# Patient Record
Sex: FEMALE | Race: OTHER | HISPANIC OR LATINO | URBAN - METROPOLITAN AREA
[De-identification: names, ages, dates, MRNs, and addresses within clinical notes are randomized per-mention and may not be internally consistent; named-entity substitution may affect disease eponyms.]

---

## 2017-07-05 ENCOUNTER — INPATIENT (INPATIENT)
Facility: HOSPITAL | Age: 42
LOS: 25 days | Discharge: HOME | End: 2017-07-31
Attending: PSYCHIATRY & NEUROLOGY | Admitting: PSYCHIATRY & NEUROLOGY
Payer: MEDICAID

## 2017-07-05 VITALS
DIASTOLIC BLOOD PRESSURE: 70 MMHG | HEART RATE: 84 BPM | SYSTOLIC BLOOD PRESSURE: 155 MMHG | RESPIRATION RATE: 18 BRPM | OXYGEN SATURATION: 98 % | TEMPERATURE: 98 F

## 2017-07-05 DIAGNOSIS — F33.1 MAJOR DEPRESSIVE DISORDER, RECURRENT, MODERATE: ICD-10-CM

## 2017-07-05 LAB
ALBUMIN SERPL ELPH-MCNC: 4 G/DL — SIGNIFICANT CHANGE UP (ref 3.3–5)
ALP SERPL-CCNC: 47 U/L — SIGNIFICANT CHANGE UP (ref 40–120)
ALT FLD-CCNC: 9 U/L — SIGNIFICANT CHANGE UP (ref 4–33)
APAP SERPL-MCNC: < 15 UG/ML — LOW (ref 15–25)
AST SERPL-CCNC: 14 U/L — SIGNIFICANT CHANGE UP (ref 4–32)
BARBITURATES MEASUREMENT: NEGATIVE — SIGNIFICANT CHANGE UP
BASOPHILS # BLD AUTO: 0.05 K/UL — SIGNIFICANT CHANGE UP (ref 0–0.2)
BASOPHILS NFR BLD AUTO: 0.4 % — SIGNIFICANT CHANGE UP (ref 0–2)
BENZODIAZ SERPL-MCNC: NEGATIVE — SIGNIFICANT CHANGE UP
BILIRUB SERPL-MCNC: 0.2 MG/DL — SIGNIFICANT CHANGE UP (ref 0.2–1.2)
BUN SERPL-MCNC: 6 MG/DL — LOW (ref 7–23)
CALCIUM SERPL-MCNC: 9 MG/DL — SIGNIFICANT CHANGE UP (ref 8.4–10.5)
CHLORIDE SERPL-SCNC: 101 MMOL/L — SIGNIFICANT CHANGE UP (ref 98–107)
CO2 SERPL-SCNC: 27 MMOL/L — SIGNIFICANT CHANGE UP (ref 22–31)
CREAT SERPL-MCNC: 0.69 MG/DL — SIGNIFICANT CHANGE UP (ref 0.5–1.3)
EOSINOPHIL # BLD AUTO: 0.1 K/UL — SIGNIFICANT CHANGE UP (ref 0–0.5)
EOSINOPHIL NFR BLD AUTO: 0.8 % — SIGNIFICANT CHANGE UP (ref 0–6)
ETHANOL BLD-MCNC: < 10 MG/DL — SIGNIFICANT CHANGE UP
GLUCOSE SERPL-MCNC: 90 MG/DL — SIGNIFICANT CHANGE UP (ref 70–99)
HCG SERPL-ACNC: < 5 MIU/ML — SIGNIFICANT CHANGE UP
HCT VFR BLD CALC: 36.9 % — SIGNIFICANT CHANGE UP (ref 34.5–45)
HGB BLD-MCNC: 12.1 G/DL — SIGNIFICANT CHANGE UP (ref 11.5–15.5)
IMM GRANULOCYTES # BLD AUTO: 0.07 # — SIGNIFICANT CHANGE UP
IMM GRANULOCYTES NFR BLD AUTO: 0.5 % — SIGNIFICANT CHANGE UP (ref 0–1.5)
LYMPHOCYTES # BLD AUTO: 1.94 K/UL — SIGNIFICANT CHANGE UP (ref 1–3.3)
LYMPHOCYTES # BLD AUTO: 14.7 % — SIGNIFICANT CHANGE UP (ref 13–44)
MCHC RBC-ENTMCNC: 28.7 PG — SIGNIFICANT CHANGE UP (ref 27–34)
MCHC RBC-ENTMCNC: 32.8 % — SIGNIFICANT CHANGE UP (ref 32–36)
MCV RBC AUTO: 87.4 FL — SIGNIFICANT CHANGE UP (ref 80–100)
MONOCYTES # BLD AUTO: 0.78 K/UL — SIGNIFICANT CHANGE UP (ref 0–0.9)
MONOCYTES NFR BLD AUTO: 5.9 % — SIGNIFICANT CHANGE UP (ref 2–14)
NEUTROPHILS # BLD AUTO: 10.24 K/UL — HIGH (ref 1.8–7.4)
NEUTROPHILS NFR BLD AUTO: 77.7 % — HIGH (ref 43–77)
NRBC # FLD: 0 — SIGNIFICANT CHANGE UP
PLATELET # BLD AUTO: 322 K/UL — SIGNIFICANT CHANGE UP (ref 150–400)
PMV BLD: 10.9 FL — SIGNIFICANT CHANGE UP (ref 7–13)
POTASSIUM SERPL-MCNC: 4 MMOL/L — SIGNIFICANT CHANGE UP (ref 3.5–5.3)
POTASSIUM SERPL-SCNC: 4 MMOL/L — SIGNIFICANT CHANGE UP (ref 3.5–5.3)
PROT SERPL-MCNC: 7.2 G/DL — SIGNIFICANT CHANGE UP (ref 6–8.3)
RBC # BLD: 4.22 M/UL — SIGNIFICANT CHANGE UP (ref 3.8–5.2)
RBC # FLD: 14 % — SIGNIFICANT CHANGE UP (ref 10.3–14.5)
SALICYLATES SERPL-MCNC: < 5 MG/DL — LOW (ref 15–30)
SODIUM SERPL-SCNC: 139 MMOL/L — SIGNIFICANT CHANGE UP (ref 135–145)
TSH SERPL-MCNC: 1.34 UIU/ML — SIGNIFICANT CHANGE UP (ref 0.27–4.2)
WBC # BLD: 13.18 K/UL — HIGH (ref 3.8–10.5)
WBC # FLD AUTO: 13.18 K/UL — HIGH (ref 3.8–10.5)

## 2017-07-05 PROCEDURE — 99285 EMERGENCY DEPT VISIT HI MDM: CPT

## 2017-07-05 RX ORDER — DIPHENHYDRAMINE HCL 50 MG
50 CAPSULE ORAL EVERY 4 HOURS
Qty: 0 | Refills: 0 | Status: DISCONTINUED | OUTPATIENT
Start: 2017-07-06 | End: 2017-07-31

## 2017-07-05 RX ORDER — HALOPERIDOL DECANOATE 100 MG/ML
5 INJECTION INTRAMUSCULAR ONCE
Qty: 0 | Refills: 0 | Status: DISCONTINUED | OUTPATIENT
Start: 2017-07-06 | End: 2017-07-31

## 2017-07-05 RX ORDER — LANOLIN ALCOHOL/MO/W.PET/CERES
3 CREAM (GRAM) TOPICAL ONCE
Qty: 0 | Refills: 0 | Status: DISCONTINUED | OUTPATIENT
Start: 2017-07-06 | End: 2017-07-31

## 2017-07-05 RX ORDER — ACETAMINOPHEN 500 MG
650 TABLET ORAL EVERY 6 HOURS
Qty: 0 | Refills: 0 | Status: DISCONTINUED | OUTPATIENT
Start: 2017-07-06 | End: 2017-07-31

## 2017-07-05 RX ORDER — DIPHENHYDRAMINE HCL 50 MG
50 CAPSULE ORAL ONCE
Qty: 0 | Refills: 0 | Status: DISCONTINUED | OUTPATIENT
Start: 2017-07-06 | End: 2017-07-31

## 2017-07-05 NOTE — ED BEHAVIORAL HEALTH ASSESSMENT NOTE - OTHER
family confused suspected delusions none reported on direct questioning, patient does not seem to be responding to internal stimuli
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
## 2017-07-05 NOTE — ED ADULT NURSE NOTE - NSPATIENTFLAG_GEN_A_ER
Chief Complaint   Patient presents with    Medication Refill     RISPERDAL     Spoke with Angelina Langley (counselor) and informed that Aletha Reynolds DNP approved the 2 week supply of Risperdal.  Left message to both patient's phone that the medication has been filled and sent to [address] [place] [address]
Green (Altered Mental Status/Behavior)
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
## 2017-07-05 NOTE — ED BEHAVIORAL HEALTH ASSESSMENT NOTE - DESCRIPTION
calm, cooperative but vague and illogical, indecisive. Has hard time signing paperwork, taking an unusually long time and a lot of reassurance. Concerned that in the hospital, she will have to be around other people and she might "get worse". none Never was able to keep a job - always late, can't keep hours, also gets paranoid about people looking at her
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
## 2017-07-06 PROCEDURE — 99222 1ST HOSP IP/OBS MODERATE 55: CPT | Mod: GC

## 2017-07-06 RX ORDER — QUETIAPINE FUMARATE 200 MG/1
50 TABLET, FILM COATED ORAL AT BEDTIME
Qty: 0 | Refills: 0 | Status: DISCONTINUED | OUTPATIENT
Start: 2017-07-06 | End: 2017-07-07

## 2017-07-06 RX ORDER — CITALOPRAM 10 MG/1
40 TABLET, FILM COATED ORAL DAILY
Qty: 0 | Refills: 0 | Status: DISCONTINUED | OUTPATIENT
Start: 2017-07-06 | End: 2017-07-07

## 2017-07-06 RX ADMIN — QUETIAPINE FUMARATE 50 MILLIGRAM(S): 200 TABLET, FILM COATED ORAL at 20:34

## 2017-07-07 PROCEDURE — 99232 SBSQ HOSP IP/OBS MODERATE 35: CPT | Mod: GC

## 2017-07-07 PROCEDURE — 70551 MRI BRAIN STEM W/O DYE: CPT | Mod: 26

## 2017-07-07 RX ORDER — QUETIAPINE FUMARATE 200 MG/1
100 TABLET, FILM COATED ORAL AT BEDTIME
Qty: 0 | Refills: 0 | Status: DISCONTINUED | OUTPATIENT
Start: 2017-07-07 | End: 2017-07-09

## 2017-07-07 RX ADMIN — QUETIAPINE FUMARATE 100 MILLIGRAM(S): 200 TABLET, FILM COATED ORAL at 21:43

## 2017-07-08 PROCEDURE — 99232 SBSQ HOSP IP/OBS MODERATE 35: CPT

## 2017-07-08 RX ADMIN — QUETIAPINE FUMARATE 100 MILLIGRAM(S): 200 TABLET, FILM COATED ORAL at 20:49

## 2017-07-09 PROCEDURE — 99232 SBSQ HOSP IP/OBS MODERATE 35: CPT

## 2017-07-09 RX ORDER — QUETIAPINE FUMARATE 200 MG/1
150 TABLET, FILM COATED ORAL AT BEDTIME
Qty: 0 | Refills: 0 | Status: DISCONTINUED | OUTPATIENT
Start: 2017-07-09 | End: 2017-07-11

## 2017-07-09 RX ADMIN — QUETIAPINE FUMARATE 150 MILLIGRAM(S): 200 TABLET, FILM COATED ORAL at 21:20

## 2017-07-10 PROCEDURE — 99232 SBSQ HOSP IP/OBS MODERATE 35: CPT | Mod: GC

## 2017-07-10 RX ADMIN — QUETIAPINE FUMARATE 150 MILLIGRAM(S): 200 TABLET, FILM COATED ORAL at 22:01

## 2017-07-11 PROCEDURE — 99232 SBSQ HOSP IP/OBS MODERATE 35: CPT | Mod: 25,GC

## 2017-07-11 PROCEDURE — 90853 GROUP PSYCHOTHERAPY: CPT

## 2017-07-11 RX ORDER — QUETIAPINE FUMARATE 200 MG/1
200 TABLET, FILM COATED ORAL AT BEDTIME
Qty: 0 | Refills: 0 | Status: DISCONTINUED | OUTPATIENT
Start: 2017-07-11 | End: 2017-07-11

## 2017-07-11 RX ORDER — QUETIAPINE FUMARATE 200 MG/1
250 TABLET, FILM COATED ORAL AT BEDTIME
Qty: 0 | Refills: 0 | Status: DISCONTINUED | OUTPATIENT
Start: 2017-07-11 | End: 2017-07-12

## 2017-07-11 RX ADMIN — QUETIAPINE FUMARATE 250 MILLIGRAM(S): 200 TABLET, FILM COATED ORAL at 22:42

## 2017-07-12 PROCEDURE — 99232 SBSQ HOSP IP/OBS MODERATE 35: CPT | Mod: 25,GC

## 2017-07-12 PROCEDURE — 90853 GROUP PSYCHOTHERAPY: CPT

## 2017-07-12 RX ORDER — QUETIAPINE FUMARATE 200 MG/1
300 TABLET, FILM COATED ORAL AT BEDTIME
Qty: 0 | Refills: 0 | Status: DISCONTINUED | OUTPATIENT
Start: 2017-07-12 | End: 2017-07-13

## 2017-07-12 RX ORDER — QUETIAPINE FUMARATE 200 MG/1
25 TABLET, FILM COATED ORAL DAILY
Qty: 0 | Refills: 0 | Status: DISCONTINUED | OUTPATIENT
Start: 2017-07-12 | End: 2017-07-17

## 2017-07-12 RX ADMIN — QUETIAPINE FUMARATE 300 MILLIGRAM(S): 200 TABLET, FILM COATED ORAL at 21:43

## 2017-07-13 PROCEDURE — 99232 SBSQ HOSP IP/OBS MODERATE 35: CPT | Mod: GC

## 2017-07-13 RX ORDER — QUETIAPINE FUMARATE 200 MG/1
350 TABLET, FILM COATED ORAL AT BEDTIME
Qty: 0 | Refills: 0 | Status: DISCONTINUED | OUTPATIENT
Start: 2017-07-13 | End: 2017-07-14

## 2017-07-13 RX ADMIN — QUETIAPINE FUMARATE 350 MILLIGRAM(S): 200 TABLET, FILM COATED ORAL at 21:58

## 2017-07-13 RX ADMIN — QUETIAPINE FUMARATE 25 MILLIGRAM(S): 200 TABLET, FILM COATED ORAL at 10:13

## 2017-07-14 PROCEDURE — 99232 SBSQ HOSP IP/OBS MODERATE 35: CPT | Mod: GC

## 2017-07-14 RX ORDER — QUETIAPINE FUMARATE 200 MG/1
400 TABLET, FILM COATED ORAL AT BEDTIME
Qty: 0 | Refills: 0 | Status: DISCONTINUED | OUTPATIENT
Start: 2017-07-14 | End: 2017-07-17

## 2017-07-14 RX ADMIN — QUETIAPINE FUMARATE 25 MILLIGRAM(S): 200 TABLET, FILM COATED ORAL at 08:30

## 2017-07-14 RX ADMIN — QUETIAPINE FUMARATE 400 MILLIGRAM(S): 200 TABLET, FILM COATED ORAL at 20:39

## 2017-07-15 RX ADMIN — QUETIAPINE FUMARATE 400 MILLIGRAM(S): 200 TABLET, FILM COATED ORAL at 20:31

## 2017-07-15 RX ADMIN — QUETIAPINE FUMARATE 25 MILLIGRAM(S): 200 TABLET, FILM COATED ORAL at 08:41

## 2017-07-16 RX ADMIN — QUETIAPINE FUMARATE 25 MILLIGRAM(S): 200 TABLET, FILM COATED ORAL at 08:50

## 2017-07-16 RX ADMIN — QUETIAPINE FUMARATE 400 MILLIGRAM(S): 200 TABLET, FILM COATED ORAL at 21:03

## 2017-07-17 PROCEDURE — 99232 SBSQ HOSP IP/OBS MODERATE 35: CPT | Mod: GC

## 2017-07-17 RX ORDER — QUETIAPINE FUMARATE 200 MG/1
400 TABLET, FILM COATED ORAL AT BEDTIME
Qty: 0 | Refills: 0 | Status: DISCONTINUED | OUTPATIENT
Start: 2017-07-17 | End: 2017-07-19

## 2017-07-17 RX ORDER — QUETIAPINE FUMARATE 200 MG/1
75 TABLET, FILM COATED ORAL DAILY
Qty: 0 | Refills: 0 | Status: DISCONTINUED | OUTPATIENT
Start: 2017-07-17 | End: 2017-07-18

## 2017-07-17 RX ADMIN — QUETIAPINE FUMARATE 400 MILLIGRAM(S): 200 TABLET, FILM COATED ORAL at 22:23

## 2017-07-17 RX ADMIN — QUETIAPINE FUMARATE 25 MILLIGRAM(S): 200 TABLET, FILM COATED ORAL at 08:00

## 2017-07-18 PROCEDURE — 99232 SBSQ HOSP IP/OBS MODERATE 35: CPT | Mod: GC

## 2017-07-18 RX ORDER — QUETIAPINE FUMARATE 200 MG/1
125 TABLET, FILM COATED ORAL DAILY
Qty: 0 | Refills: 0 | Status: DISCONTINUED | OUTPATIENT
Start: 2017-07-18 | End: 2017-07-20

## 2017-07-18 RX ADMIN — QUETIAPINE FUMARATE 400 MILLIGRAM(S): 200 TABLET, FILM COATED ORAL at 20:47

## 2017-07-18 RX ADMIN — QUETIAPINE FUMARATE 75 MILLIGRAM(S): 200 TABLET, FILM COATED ORAL at 09:27

## 2017-07-19 PROCEDURE — 99232 SBSQ HOSP IP/OBS MODERATE 35: CPT | Mod: 25,GC

## 2017-07-19 RX ORDER — QUETIAPINE FUMARATE 200 MG/1
450 TABLET, FILM COATED ORAL AT BEDTIME
Qty: 0 | Refills: 0 | Status: DISCONTINUED | OUTPATIENT
Start: 2017-07-19 | End: 2017-07-24

## 2017-07-19 RX ADMIN — QUETIAPINE FUMARATE 125 MILLIGRAM(S): 200 TABLET, FILM COATED ORAL at 09:07

## 2017-07-19 RX ADMIN — QUETIAPINE FUMARATE 450 MILLIGRAM(S): 200 TABLET, FILM COATED ORAL at 20:39

## 2017-07-20 PROCEDURE — 99232 SBSQ HOSP IP/OBS MODERATE 35: CPT | Mod: GC

## 2017-07-20 RX ORDER — QUETIAPINE FUMARATE 200 MG/1
150 TABLET, FILM COATED ORAL DAILY
Qty: 0 | Refills: 0 | Status: DISCONTINUED | OUTPATIENT
Start: 2017-07-21 | End: 2017-07-24

## 2017-07-20 RX ADMIN — QUETIAPINE FUMARATE 450 MILLIGRAM(S): 200 TABLET, FILM COATED ORAL at 21:13

## 2017-07-20 RX ADMIN — QUETIAPINE FUMARATE 125 MILLIGRAM(S): 200 TABLET, FILM COATED ORAL at 09:46

## 2017-07-21 PROCEDURE — 99232 SBSQ HOSP IP/OBS MODERATE 35: CPT | Mod: GC

## 2017-07-21 RX ADMIN — QUETIAPINE FUMARATE 450 MILLIGRAM(S): 200 TABLET, FILM COATED ORAL at 20:58

## 2017-07-21 RX ADMIN — QUETIAPINE FUMARATE 150 MILLIGRAM(S): 200 TABLET, FILM COATED ORAL at 08:46

## 2017-07-22 RX ADMIN — QUETIAPINE FUMARATE 150 MILLIGRAM(S): 200 TABLET, FILM COATED ORAL at 09:52

## 2017-07-22 RX ADMIN — QUETIAPINE FUMARATE 450 MILLIGRAM(S): 200 TABLET, FILM COATED ORAL at 21:17

## 2017-07-23 RX ADMIN — QUETIAPINE FUMARATE 150 MILLIGRAM(S): 200 TABLET, FILM COATED ORAL at 09:31

## 2017-07-23 RX ADMIN — QUETIAPINE FUMARATE 450 MILLIGRAM(S): 200 TABLET, FILM COATED ORAL at 21:58

## 2017-07-24 PROCEDURE — 99232 SBSQ HOSP IP/OBS MODERATE 35: CPT | Mod: GC

## 2017-07-24 RX ORDER — QUETIAPINE FUMARATE 200 MG/1
400 TABLET, FILM COATED ORAL AT BEDTIME
Qty: 0 | Refills: 0 | Status: DISCONTINUED | OUTPATIENT
Start: 2017-07-25 | End: 2017-07-31

## 2017-07-24 RX ORDER — QUETIAPINE FUMARATE 200 MG/1
100 TABLET, FILM COATED ORAL
Qty: 0 | Refills: 0 | Status: DISCONTINUED | OUTPATIENT
Start: 2017-07-25 | End: 2017-07-31

## 2017-07-24 RX ORDER — QUETIAPINE FUMARATE 200 MG/1
450 TABLET, FILM COATED ORAL ONCE
Qty: 0 | Refills: 0 | Status: COMPLETED | OUTPATIENT
Start: 2017-07-24 | End: 2017-07-24

## 2017-07-24 RX ADMIN — QUETIAPINE FUMARATE 450 MILLIGRAM(S): 200 TABLET, FILM COATED ORAL at 22:15

## 2017-07-24 RX ADMIN — QUETIAPINE FUMARATE 150 MILLIGRAM(S): 200 TABLET, FILM COATED ORAL at 08:57

## 2017-07-25 PROCEDURE — 99232 SBSQ HOSP IP/OBS MODERATE 35: CPT | Mod: GC

## 2017-07-25 RX ADMIN — QUETIAPINE FUMARATE 100 MILLIGRAM(S): 200 TABLET, FILM COATED ORAL at 09:18

## 2017-07-25 RX ADMIN — QUETIAPINE FUMARATE 400 MILLIGRAM(S): 200 TABLET, FILM COATED ORAL at 20:23

## 2017-07-25 RX ADMIN — QUETIAPINE FUMARATE 100 MILLIGRAM(S): 200 TABLET, FILM COATED ORAL at 20:23

## 2017-07-26 LAB
ALBUMIN SERPL ELPH-MCNC: 3.8 G/DL — SIGNIFICANT CHANGE UP (ref 3.3–5)
ALP SERPL-CCNC: 47 U/L — SIGNIFICANT CHANGE UP (ref 40–120)
ALT FLD-CCNC: 12 U/L — SIGNIFICANT CHANGE UP (ref 4–33)
AST SERPL-CCNC: 14 U/L — SIGNIFICANT CHANGE UP (ref 4–32)
BILIRUB SERPL-MCNC: 0.4 MG/DL — SIGNIFICANT CHANGE UP (ref 0.2–1.2)
BUN SERPL-MCNC: 7 MG/DL — SIGNIFICANT CHANGE UP (ref 7–23)
CALCIUM SERPL-MCNC: 9 MG/DL — SIGNIFICANT CHANGE UP (ref 8.4–10.5)
CHLORIDE SERPL-SCNC: 102 MMOL/L — SIGNIFICANT CHANGE UP (ref 98–107)
CHOLEST SERPL-MCNC: 201 MG/DL — HIGH (ref 120–199)
CO2 SERPL-SCNC: 29 MMOL/L — SIGNIFICANT CHANGE UP (ref 22–31)
CREAT SERPL-MCNC: 0.7 MG/DL — SIGNIFICANT CHANGE UP (ref 0.5–1.3)
GLUCOSE SERPL-MCNC: 81 MG/DL — SIGNIFICANT CHANGE UP (ref 70–99)
HBA1C BLD-MCNC: 5.2 % — SIGNIFICANT CHANGE UP (ref 4–5.6)
HCT VFR BLD CALC: 37.2 % — SIGNIFICANT CHANGE UP (ref 34.5–45)
HDLC SERPL-MCNC: 45 MG/DL — SIGNIFICANT CHANGE UP (ref 45–65)
HGB BLD-MCNC: 12.4 G/DL — SIGNIFICANT CHANGE UP (ref 11.5–15.5)
LIPID PNL WITH DIRECT LDL SERPL: 150 MG/DL — SIGNIFICANT CHANGE UP
MCHC RBC-ENTMCNC: 29 PG — SIGNIFICANT CHANGE UP (ref 27–34)
MCHC RBC-ENTMCNC: 33.3 % — SIGNIFICANT CHANGE UP (ref 32–36)
MCV RBC AUTO: 87.1 FL — SIGNIFICANT CHANGE UP (ref 80–100)
NRBC # FLD: 0 — SIGNIFICANT CHANGE UP
PLATELET # BLD AUTO: 290 K/UL — SIGNIFICANT CHANGE UP (ref 150–400)
PMV BLD: 10.8 FL — SIGNIFICANT CHANGE UP (ref 7–13)
POTASSIUM SERPL-MCNC: 3.8 MMOL/L — SIGNIFICANT CHANGE UP (ref 3.5–5.3)
POTASSIUM SERPL-SCNC: 3.8 MMOL/L — SIGNIFICANT CHANGE UP (ref 3.5–5.3)
PROT SERPL-MCNC: 7.1 G/DL — SIGNIFICANT CHANGE UP (ref 6–8.3)
RBC # BLD: 4.27 M/UL — SIGNIFICANT CHANGE UP (ref 3.8–5.2)
RBC # FLD: 13.6 % — SIGNIFICANT CHANGE UP (ref 10.3–14.5)
SODIUM SERPL-SCNC: 141 MMOL/L — SIGNIFICANT CHANGE UP (ref 135–145)
TRIGL SERPL-MCNC: 79 MG/DL — SIGNIFICANT CHANGE UP (ref 10–149)
WBC # BLD: 8.09 K/UL — SIGNIFICANT CHANGE UP (ref 3.8–10.5)
WBC # FLD AUTO: 8.09 K/UL — SIGNIFICANT CHANGE UP (ref 3.8–10.5)

## 2017-07-26 PROCEDURE — 99232 SBSQ HOSP IP/OBS MODERATE 35: CPT | Mod: GC

## 2017-07-26 RX ADMIN — QUETIAPINE FUMARATE 400 MILLIGRAM(S): 200 TABLET, FILM COATED ORAL at 21:25

## 2017-07-26 RX ADMIN — QUETIAPINE FUMARATE 100 MILLIGRAM(S): 200 TABLET, FILM COATED ORAL at 08:29

## 2017-07-26 RX ADMIN — QUETIAPINE FUMARATE 100 MILLIGRAM(S): 200 TABLET, FILM COATED ORAL at 21:25

## 2017-07-27 PROCEDURE — 99232 SBSQ HOSP IP/OBS MODERATE 35: CPT | Mod: GC

## 2017-07-27 RX ADMIN — QUETIAPINE FUMARATE 100 MILLIGRAM(S): 200 TABLET, FILM COATED ORAL at 09:05

## 2017-07-27 RX ADMIN — QUETIAPINE FUMARATE 400 MILLIGRAM(S): 200 TABLET, FILM COATED ORAL at 23:11

## 2017-07-27 RX ADMIN — QUETIAPINE FUMARATE 100 MILLIGRAM(S): 200 TABLET, FILM COATED ORAL at 23:11

## 2017-07-28 PROCEDURE — 99232 SBSQ HOSP IP/OBS MODERATE 35: CPT | Mod: GC

## 2017-07-28 RX ORDER — QUETIAPINE FUMARATE 200 MG/1
1 TABLET, FILM COATED ORAL
Qty: 0 | Refills: 0 | COMMUNITY
Start: 2017-07-28

## 2017-07-28 RX ORDER — QUETIAPINE FUMARATE 200 MG/1
1 TABLET, FILM COATED ORAL
Qty: 28 | Refills: 0 | OUTPATIENT
Start: 2017-07-28 | End: 2017-08-11

## 2017-07-28 RX ORDER — QUETIAPINE FUMARATE 200 MG/1
1 TABLET, FILM COATED ORAL
Qty: 14 | Refills: 0 | OUTPATIENT
Start: 2017-07-28 | End: 2017-08-11

## 2017-07-28 RX ADMIN — QUETIAPINE FUMARATE 100 MILLIGRAM(S): 200 TABLET, FILM COATED ORAL at 08:11

## 2017-07-28 RX ADMIN — QUETIAPINE FUMARATE 400 MILLIGRAM(S): 200 TABLET, FILM COATED ORAL at 22:26

## 2017-07-28 RX ADMIN — QUETIAPINE FUMARATE 100 MILLIGRAM(S): 200 TABLET, FILM COATED ORAL at 22:26

## 2017-07-29 RX ADMIN — QUETIAPINE FUMARATE 100 MILLIGRAM(S): 200 TABLET, FILM COATED ORAL at 09:39

## 2017-07-29 RX ADMIN — QUETIAPINE FUMARATE 100 MILLIGRAM(S): 200 TABLET, FILM COATED ORAL at 21:07

## 2017-07-29 RX ADMIN — QUETIAPINE FUMARATE 400 MILLIGRAM(S): 200 TABLET, FILM COATED ORAL at 21:07

## 2017-07-30 RX ADMIN — QUETIAPINE FUMARATE 100 MILLIGRAM(S): 200 TABLET, FILM COATED ORAL at 21:58

## 2017-07-30 RX ADMIN — QUETIAPINE FUMARATE 100 MILLIGRAM(S): 200 TABLET, FILM COATED ORAL at 08:54

## 2017-07-30 RX ADMIN — QUETIAPINE FUMARATE 400 MILLIGRAM(S): 200 TABLET, FILM COATED ORAL at 21:58

## 2017-07-31 VITALS — TEMPERATURE: 98 F | RESPIRATION RATE: 18 BRPM

## 2017-07-31 PROCEDURE — 99239 HOSP IP/OBS DSCHRG MGMT >30: CPT | Mod: GC

## 2017-07-31 RX ORDER — QUETIAPINE FUMARATE 200 MG/1
1 TABLET, FILM COATED ORAL
Qty: 56 | Refills: 0
Start: 2017-07-31 | End: 2017-08-28

## 2017-07-31 RX ORDER — QUETIAPINE FUMARATE 200 MG/1
1 TABLET, FILM COATED ORAL
Qty: 28 | Refills: 0
Start: 2017-07-31 | End: 2017-08-28

## 2017-07-31 RX ADMIN — QUETIAPINE FUMARATE 100 MILLIGRAM(S): 200 TABLET, FILM COATED ORAL at 09:20

## 2021-01-10 ENCOUNTER — INPATIENT (INPATIENT)
Facility: HOSPITAL | Age: 46
LOS: 10 days | Discharge: HOME | End: 2021-01-21
Attending: PSYCHIATRY & NEUROLOGY | Admitting: PSYCHIATRY & NEUROLOGY
Payer: MEDICAID

## 2021-01-10 VITALS
RESPIRATION RATE: 18 BRPM | HEIGHT: 67 IN | DIASTOLIC BLOOD PRESSURE: 99 MMHG | HEART RATE: 100 BPM | OXYGEN SATURATION: 100 % | TEMPERATURE: 99 F | SYSTOLIC BLOOD PRESSURE: 160 MMHG

## 2021-01-10 DIAGNOSIS — F25.0 SCHIZOAFFECTIVE DISORDER, BIPOLAR TYPE: ICD-10-CM

## 2021-01-10 LAB
APPEARANCE UR: CLEAR — SIGNIFICANT CHANGE UP
B PERT DNA SPEC QL NAA+PROBE: SIGNIFICANT CHANGE UP
BASOPHILS # BLD AUTO: 0.05 K/UL — SIGNIFICANT CHANGE UP (ref 0–0.2)
BASOPHILS NFR BLD AUTO: 0.5 % — SIGNIFICANT CHANGE UP (ref 0–2)
BILIRUB UR-MCNC: NEGATIVE — SIGNIFICANT CHANGE UP
C PNEUM DNA SPEC QL NAA+PROBE: SIGNIFICANT CHANGE UP
COLOR SPEC: SIGNIFICANT CHANGE UP
DIFF PNL FLD: NEGATIVE — SIGNIFICANT CHANGE UP
EOSINOPHIL # BLD AUTO: 0.14 K/UL — SIGNIFICANT CHANGE UP (ref 0–0.5)
EOSINOPHIL NFR BLD AUTO: 1.3 % — SIGNIFICANT CHANGE UP (ref 0–6)
FLUAV H1 2009 PAND RNA SPEC QL NAA+PROBE: SIGNIFICANT CHANGE UP
FLUAV H1 RNA SPEC QL NAA+PROBE: SIGNIFICANT CHANGE UP
FLUAV H3 RNA SPEC QL NAA+PROBE: SIGNIFICANT CHANGE UP
FLUAV SUBTYP SPEC NAA+PROBE: SIGNIFICANT CHANGE UP
FLUBV RNA SPEC QL NAA+PROBE: SIGNIFICANT CHANGE UP
GLUCOSE UR QL: NEGATIVE — SIGNIFICANT CHANGE UP
HADV DNA SPEC QL NAA+PROBE: SIGNIFICANT CHANGE UP
HCG SERPL-ACNC: <5 MIU/ML — SIGNIFICANT CHANGE UP
HCOV PNL SPEC NAA+PROBE: SIGNIFICANT CHANGE UP
HCT VFR BLD CALC: 39.3 % — SIGNIFICANT CHANGE UP (ref 34.5–45)
HGB BLD-MCNC: 12.5 G/DL — SIGNIFICANT CHANGE UP (ref 11.5–15.5)
HMPV RNA SPEC QL NAA+PROBE: SIGNIFICANT CHANGE UP
HPIV1 RNA SPEC QL NAA+PROBE: SIGNIFICANT CHANGE UP
HPIV2 RNA SPEC QL NAA+PROBE: SIGNIFICANT CHANGE UP
HPIV3 RNA SPEC QL NAA+PROBE: SIGNIFICANT CHANGE UP
HPIV4 RNA SPEC QL NAA+PROBE: SIGNIFICANT CHANGE UP
IANC: 6.39 K/UL — SIGNIFICANT CHANGE UP (ref 1.5–8.5)
IMM GRANULOCYTES NFR BLD AUTO: 0.4 % — SIGNIFICANT CHANGE UP (ref 0–1.5)
KETONES UR-MCNC: NEGATIVE — SIGNIFICANT CHANGE UP
LEUKOCYTE ESTERASE UR-ACNC: ABNORMAL
LYMPHOCYTES # BLD AUTO: 3.64 K/UL — HIGH (ref 1–3.3)
LYMPHOCYTES # BLD AUTO: 33.2 % — SIGNIFICANT CHANGE UP (ref 13–44)
MCHC RBC-ENTMCNC: 25.6 PG — LOW (ref 27–34)
MCHC RBC-ENTMCNC: 31.8 GM/DL — LOW (ref 32–36)
MCV RBC AUTO: 80.4 FL — SIGNIFICANT CHANGE UP (ref 80–100)
MONOCYTES # BLD AUTO: 0.72 K/UL — SIGNIFICANT CHANGE UP (ref 0–0.9)
MONOCYTES NFR BLD AUTO: 6.6 % — SIGNIFICANT CHANGE UP (ref 2–14)
NEUTROPHILS # BLD AUTO: 6.39 K/UL — SIGNIFICANT CHANGE UP (ref 1.8–7.4)
NEUTROPHILS NFR BLD AUTO: 58 % — SIGNIFICANT CHANGE UP (ref 43–77)
NITRITE UR-MCNC: NEGATIVE — SIGNIFICANT CHANGE UP
NRBC # BLD: 0 /100 WBCS — SIGNIFICANT CHANGE UP
NRBC # FLD: 0 K/UL — SIGNIFICANT CHANGE UP
PCP SPEC-MCNC: SIGNIFICANT CHANGE UP
PH UR: 6.5 — SIGNIFICANT CHANGE UP (ref 5–8)
PLATELET # BLD AUTO: 335 K/UL — SIGNIFICANT CHANGE UP (ref 150–400)
PROT UR-MCNC: ABNORMAL
RAPID RVP RESULT: SIGNIFICANT CHANGE UP
RBC # BLD: 4.89 M/UL — SIGNIFICANT CHANGE UP (ref 3.8–5.2)
RBC # FLD: 16.9 % — HIGH (ref 10.3–14.5)
RSV RNA SPEC QL NAA+PROBE: SIGNIFICANT CHANGE UP
RV+EV RNA SPEC QL NAA+PROBE: SIGNIFICANT CHANGE UP
SARS-COV-2 RNA SPEC QL NAA+PROBE: SIGNIFICANT CHANGE UP
SP GR SPEC: 1.02 — SIGNIFICANT CHANGE UP (ref 1.01–1.02)
TOXICOLOGY SCREEN, DRUGS OF ABUSE, SERUM RESULT: SIGNIFICANT CHANGE UP
TSH SERPL-MCNC: 1.56 UIU/ML — SIGNIFICANT CHANGE UP (ref 0.27–4.2)
UROBILINOGEN FLD QL: SIGNIFICANT CHANGE UP
WBC # BLD: 10.98 K/UL — HIGH (ref 3.8–10.5)
WBC # FLD AUTO: 10.98 K/UL — HIGH (ref 3.8–10.5)

## 2021-01-10 PROCEDURE — 99285 EMERGENCY DEPT VISIT HI MDM: CPT | Mod: GC

## 2021-01-10 RX ORDER — FLUOXETINE HCL 10 MG
20 CAPSULE ORAL DAILY
Refills: 0 | Status: DISCONTINUED | OUTPATIENT
Start: 2021-01-10 | End: 2021-01-11

## 2021-01-10 RX ORDER — ARIPIPRAZOLE 15 MG/1
15 TABLET ORAL AT BEDTIME
Refills: 0 | Status: DISCONTINUED | OUTPATIENT
Start: 2021-01-10 | End: 2021-01-11

## 2021-01-10 RX ORDER — HYDROXYZINE HCL 10 MG
25 TABLET ORAL ONCE
Refills: 0 | Status: COMPLETED | OUTPATIENT
Start: 2021-01-10 | End: 2021-01-10

## 2021-01-10 RX ADMIN — Medication 25 MILLIGRAM(S): at 21:49

## 2021-01-10 RX ADMIN — Medication 20 MILLIGRAM(S): at 21:46

## 2021-01-10 RX ADMIN — ARIPIPRAZOLE 15 MILLIGRAM(S): 15 TABLET ORAL at 21:46

## 2021-01-10 RX ADMIN — Medication 1 MILLIGRAM(S): at 17:33
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
## 2021-01-10 NOTE — ED BEHAVIORAL HEALTH ASSESSMENT NOTE - OTHER
none reported on direct questioning, patient does not seem to be responding to internal stimuli domiciled on and off sister intense suspected delusions somewhat guarded patient boarding overnight - pending covid results and no [facility] beds currently available

## 2021-01-10 NOTE — ED BEHAVIORAL HEALTH ASSESSMENT NOTE - CASE SUMMARY
45 year-old with acute decompensation in the context of chronic psychosis, unable to care for herself, requires inpatient admission for safety and stabilization

## 2021-01-10 NOTE — ED BEHAVIORAL HEALTH ASSESSMENT NOTE - RISK ASSESSMENT
Patient's risk of harm to self and others is elevated by an underlying condition, SAD, with recent aggression and suicidal statements towards family. Protective factors: patient denies SI/HI, denies overt psychotic symptoms, denies depressed mood. However, risk factors include: patient is guarded/evasive, appears paranoid, and per collateral from sister patient has had worsening bizarre behavior and aggression towards family in the past week. Also with recent med change by outpatient provider (addition of Risperdal). For these reasons, patient's risk of harm is elevated to a degree which necessitates inpatient psych hospitalization for safety and stabilization. High Acute Suicide Risk
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
## 2021-01-10 NOTE — ED BEHAVIORAL HEALTH ASSESSMENT NOTE - PSYCHIATRIC ISSUES AND PLAN (INCLUDE STANDING AND PRN MEDICATION)
SAD - start Abilify 15mg daily and Prozac 20mg daily. PRNs for agitation/severe agitation: Haldol 5mg, Ativan 2mg po/IM q6H

## 2021-01-10 NOTE — ED BEHAVIORAL HEALTH ASSESSMENT NOTE - DESCRIPTION
arthritis Patient was calm and cooperative while in the ED, although somewhat bizarre and appearing paranoid/evasive at times. No PRNs needed.    Vital Signs Last 24 Hrs  T(C): 37 (10 Olivier 2021 16:15), Max: 37 (10 Olivier 2021 16:15)  T(F): 98.6 (10 Olivier 2021 16:15), Max: 98.6 (10 Olivier 2021 16:15)  HR: 100 (10 Olivier 2021 16:15) (100 - 100)  BP: 160/99 (10 Olivier 2021 16:15) (160/99 - 160/99)  BP(mean): --  RR: 18 (10 Olivier 2021 16:15) (18 - 18)  SpO2: 100% (10 Olivier 2021 16:15) (100% - 100%) per chart review: Never was able to keep a job - always late, can't keep hours, also gets paranoid about people looking at her

## 2021-01-10 NOTE — ED PROVIDER NOTE - PROGRESS NOTE DETAILS
SAUNDRA: I was signed out this pt pending Admission to [facility] for HI and erratic behaviour. I noticed CMP was not returned from lab. per lab, computer issue. Paper lab results sent up and were reviewed and are WNL. Will medically clear to be admitted to [facility] when space/bed available.
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
## 2021-01-10 NOTE — ED ADULT NURSE NOTE - OBJECTIVE STATEMENT
Pt received to  area complaining of depression for the past month. Pt states she wants to speak with someone, she has not been able to get an appt with her psychiatrist. Pt has a hx of depression and anxiety. Pt states she has been complaint with her Abilify and prozac. Pt denies si/hi. Pt calm and cooperative.
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
## 2021-01-10 NOTE — ED ADULT TRIAGE NOTE - CHIEF COMPLAINT QUOTE
Pt c/o increased depression X1 month, hx of anxiety & depression. Reports not being able to get an appointment with her psychiatrist. Currently taking Abilify & Prozac as prescribed. Denies ETOH use, drug use, SI/HI, hallucinations.    Stephany Muniz, sister: [phone]

## 2021-01-10 NOTE — ED BEHAVIORAL HEALTH ASSESSMENT NOTE - NS ED BHA PLAN ADMIT TO PSYCHIATRY BH CONTACTED FT
Spoke with Delia Grant NP after finding her phone # in NPI profile. Will email her details of this ED visit. She provided email address
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
## 2021-01-10 NOTE — ED PROVIDER NOTE - CLINICAL SUMMARY MEDICAL DECISION MAKING FREE TEXT BOX
46 y/o F   Labs, Urine Tox/UA, EKG  Medical evaluation performed. There is no clinical evidence of intoxication or any acute medical problem requiring immediate intervention. Patient is awaiting psychiatric consultation. Final disposition will be determined by psychiatrist. 46 y/o F   Labs, Urine Tox/UA, EKG  Medical evaluation performed. There is no clinical evidence of intoxication or any acute medical problem requiring immediate intervention. Patient is awaiting psychiatric consultation. Final disposition will be determined by psychiatrist. Awaiting inpatient psychiatric bed.

## 2021-01-10 NOTE — ED ADULT NURSE NOTE - CHIEF COMPLAINT QUOTE
Pt c/o increased depression X1 month, hx of anxiety & depression. Reports not being able to get an appointment with her psychiatrist. Currently taking Abilify & Prozac as prescribed. Denies ETOH use, drug use, SI/HI, hallucinations.    Stephany Muniz, sister: [phone]

## 2021-01-11 DIAGNOSIS — F25.9 SCHIZOAFFECTIVE DISORDER, UNSPECIFIED: ICD-10-CM

## 2021-01-11 LAB
SARS-COV-2 IGG SERPL QL IA: NEGATIVE — SIGNIFICANT CHANGE UP
SARS-COV-2 IGM SERPL IA-ACNC: 0.12 INDEX — SIGNIFICANT CHANGE UP

## 2021-01-11 PROCEDURE — 99213 OFFICE O/P EST LOW 20 MIN: CPT

## 2021-01-11 PROCEDURE — 99222 1ST HOSP IP/OBS MODERATE 55: CPT

## 2021-01-11 PROCEDURE — 93010 ELECTROCARDIOGRAM REPORT: CPT

## 2021-01-11 PROCEDURE — 99285 EMERGENCY DEPT VISIT HI MDM: CPT | Mod: 25

## 2021-01-11 RX ORDER — DIPHENHYDRAMINE HCL 50 MG
50 CAPSULE ORAL EVERY 6 HOURS
Refills: 0 | Status: DISCONTINUED | OUTPATIENT
Start: 2021-01-11 | End: 2021-01-21

## 2021-01-11 RX ORDER — DIPHENHYDRAMINE HCL 50 MG
50 CAPSULE ORAL ONCE
Refills: 0 | Status: DISCONTINUED | OUTPATIENT
Start: 2021-01-11 | End: 2021-01-21

## 2021-01-11 RX ORDER — ARIPIPRAZOLE 15 MG/1
15 TABLET ORAL AT BEDTIME
Refills: 0 | Status: DISCONTINUED | OUTPATIENT
Start: 2021-01-11 | End: 2021-01-14

## 2021-01-11 RX ORDER — FLUOXETINE HCL 10 MG
20 CAPSULE ORAL DAILY
Refills: 0 | Status: DISCONTINUED | OUTPATIENT
Start: 2021-01-11 | End: 2021-01-11

## 2021-01-11 RX ORDER — HALOPERIDOL DECANOATE 100 MG/ML
5 INJECTION INTRAMUSCULAR EVERY 6 HOURS
Refills: 0 | Status: DISCONTINUED | OUTPATIENT
Start: 2021-01-11 | End: 2021-01-21

## 2021-01-11 RX ORDER — LANOLIN ALCOHOL/MO/W.PET/CERES
5 CREAM (GRAM) TOPICAL AT BEDTIME
Refills: 0 | Status: DISCONTINUED | OUTPATIENT
Start: 2021-01-11 | End: 2021-01-21

## 2021-01-11 RX ADMIN — Medication 5 MILLIGRAM(S): at 20:23

## 2021-01-11 RX ADMIN — ARIPIPRAZOLE 15 MILLIGRAM(S): 15 TABLET ORAL at 20:23

## 2021-01-11 NOTE — ED ADULT NURSE REASSESSMENT NOTE - NS ED NURSE REASSESS COMMENT FT1
Pt sleeping; respirations even and non labored. Pt awaiting psychiatric inpatient admission bed when available.
Pt sleeping in  room 3.  Respirations even and unlabored.  Will continue to monitor.
Pt sleeping in  room 3.  Respirations even and unlabored.  Will continue to monitor.

## 2021-01-11 NOTE — BH CONSULTATION LIAISON PROGRESS NOTE - CASE SUMMARY
44F with schizoaffective disorder presents for bizarre behavior. Patient guarded, vague, per family is not taking meds, aggressive and talking about suicide. She requires admisison to stabilize. Admit on 939. I agree with note/plan.

## 2021-01-11 NOTE — BH INPATIENT PSYCHIATRY ASSESSMENT NOTE - RISK ASSESSMENT
Patient's risk of harm to self and others is elevated by an underlying condition, SAD, with recent aggression and suicidal statements towards family. Protective factors: patient denies SI/HI, denies overt psychotic symptoms, denies depressed mood. However, risk factors include: patient is guarded/evasive, appears paranoid, and per collateral from sister patient has had worsening bizarre behavior and aggression towards family in the past week. Also with recent med change by outpatient provider (addition of Risperdal). For these reasons, patient's risk of harm is elevated to a degree which necessitates inpatient psych hospitalization for safety and stabilization.

## 2021-01-11 NOTE — BH INPATIENT PSYCHIATRY ASSESSMENT NOTE - CURRENT MEDICATION
MEDICATIONS  (STANDING):    MEDICATIONS  (PRN):  diphenhydrAMINE 50 milliGRAM(s) Oral every 6 hours PRN agitation  diphenhydrAMINE   Injectable 50 milliGRAM(s) IntraMuscular once PRN agitation  haloperidol     Tablet 5 milliGRAM(s) Oral every 6 hours PRN agitaion  haloperidol    Injectable 5 milliGRAM(s) IntraMuscular every 6 hours PRN severe agitaion  LORazepam     Tablet 2 milliGRAM(s) Oral every 6 hours PRN agitation  LORazepam   Injectable 2 milliGRAM(s) IntraMuscular every 6 hours PRN severe agitaion  melatonin. 5 milliGRAM(s) Oral at bedtime PRN Insomnia

## 2021-01-11 NOTE — BH CONSULTATION LIAISON PROGRESS NOTE - NSBHCHARTREVIEWVS_PSY_A_CORE FT
Vital Signs Last 24 Hrs  T(C): 36.7 (11 Jan 2021 07:53), Max: 37.1 (10 Olivier 2021 20:06)  T(F): 98 (11 Jan 2021 07:53), Max: 98.7 (10 Olivier 2021 20:06)  HR: 116 (11 Jan 2021 07:53) (100 - 116)  BP: 131/90 (11 Jan 2021 07:53) (125/84 - 160/99)  BP(mean): --  RR: 16 (11 Jan 2021 07:53) (16 - 18)  SpO2: 100% (11 Jan 2021 07:53) (100% - 100%)

## 2021-01-11 NOTE — BH PATIENT PROFILE - STATED REASON FOR ADMISSION
Pt stated "I've been feeling anxious".  Pt guarded and withholding information, answering "no" to most questions.  Pt admitted for bizarre behavior and aggression at home, making suicidal statements and took 6 Tylenol tabs in front of her mother

## 2021-01-11 NOTE — BH CONSULTATION LIAISON PROGRESS NOTE - NSBHFUPINTERVALHXFT_PSY_A_CORE
Receive pt resting, calm, cooperative, thought process seem more organized, does not appear internally preoccupied, paranoid or delusional at present.  Pt reports of little sleep last night, did not want to elaborate on reason for being in the ED, reports her overall mood as "good", denies SI/HI, denies AVH.  Has been in good behavioral control in the ED, no prn's administered overnight.  Reinforced that she is admitted and waiting for bed placement.  States "ok" about being admitted.

## 2021-01-11 NOTE — BH INPATIENT PSYCHIATRY ASSESSMENT NOTE - NSBHASSESSSUMMFT_PSY_ALL_CORE
On the unit: The patient was visible on the unit, guarded, superficially engaged in the interview. The patient has poor insight into her illness and stated she does not understand why she is in the hospital. The patient denies events leading up to her admission into the hospital and stated "I do not understand why I am in here involuntary and not voluntary", "they should have given me a choice to be voluntary". The patient minimalizes her symptoms and stated "I just have a little bit of anxiety". She denies perceptual AH, VH, TH. Paranoia elicited. She denies SI, HI, intent and plan. The patient stated her mood is "okay".    >Obs: Routine, no current SI. no need for CO, patient not expected to pose risk to self or others in controlled inpatient setting.  >Psychiatric Meds: Abilify 15mg po daily.  >Medical:  No acute concerns  >Social: milieu therapy  >Treatment Interventions: Groups and Individual Therapy  >Dispo: Collateral and dispo planning pending further symptom and medication optimization.
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
## 2021-01-11 NOTE — BH INPATIENT PSYCHIATRY ASSESSMENT NOTE - OTHER
none reported on direct questioning, patient does not seem to be responding to internal stimuli suspected delusions and paranoia. at present appears linear, however presented with impaired reasoning
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
## 2021-01-11 NOTE — BH PATIENT PROFILE - HOME MEDICATIONS
QUEtiapine 400 mg oral tablet , 1 tab(s) orally once a day (at bedtime)  QUEtiapine 100 mg oral tablet , 1 tab(s) orally 2 times a day

## 2021-01-11 NOTE — BH INPATIENT PSYCHIATRY ASSESSMENT NOTE - NSBHCHARTREVIEWVS_PSY_A_CORE FT
Vital Signs Last 24 Hrs  T(C): 36.5 (11 Jan 2021 14:24), Max: 37.1 (10 Olivier 2021 20:06)  T(F): 97.7 (11 Jan 2021 14:24), Max: 98.7 (10 Olivier 2021 20:06)  HR: 102 (11 Jan 2021 11:17) (100 - 116)  BP: 128/81 (11 Jan 2021 11:17) (125/84 - 160/99)  BP(mean): --  RR: 16 (11 Jan 2021 11:17) (16 - 18)  SpO2: 100% (11 Jan 2021 11:17) (100% - 100%)

## 2021-01-11 NOTE — BH INPATIENT PSYCHIATRY ASSESSMENT NOTE - NSSUICPROTFACT_PSY_ALL_CORE
Responsibility to children, family, or others/Identifies reasons for living/Positive therapeutic relationships

## 2021-01-11 NOTE — BH CONSULTATION LIAISON PROGRESS NOTE - NSBHASSESSMENTFT_PSY_ALL_CORE
Pt is a 45 y/o  female with PPH of schizoaffective d/o with multiple inpatient hospitalizations, compliance with meds, recently started on Risperdal, presents with 1 week of worsening bizarre behavior, aggression towards family members, and suicidal statements. At present pt is calm, cooperative, does not appear psychotic, manic at present, and has been in good behavioral control in the ED. She has been some what evasive, guarded, did not want to elaborate on reasons for being in the ED. Collateral information provided, reports that pt has decompensated, suicidal, not doing well. Pt will be admitted on a 9.39 basis and will be transferred when a bed becomes available.

## 2021-01-11 NOTE — BH CONSULTATION LIAISON PROGRESS NOTE - NSBHCONSULTIPREASON_PSY_A_CORE
danger to others; mental illness expected to respond to inpatient care danger to self; mental illness expected to respond to inpatient care

## 2021-01-11 NOTE — BH CONSULTATION LIAISON PROGRESS NOTE - OTHER
"good" none reported on direct questioning, patient does not seem to be responding to internal stimuli at present appears linear, however presented with impaired reasoning  unable to elicit

## 2021-01-11 NOTE — BH INPATIENT PSYCHIATRY ASSESSMENT NOTE - DESCRIPTION
single, has 2 teenage sons (per sister, not in her custody) unemployed, domiciled intermittently with mom vs. homeless shelter. per chart review: Never was able to keep a job - always late, can't keep hours, also gets paranoid about people looking at her.

## 2021-01-11 NOTE — BH INPATIENT PSYCHIATRY ASSESSMENT NOTE - NSBHMETABOLIC_PSY_ALL_CORE_FT
BMI: BMI (kg/m2): 37.6 (01-11-21 @ 12:50)  HbA1c:   Glucose:   BP: 128/81 (01-11-21 @ 11:17) (125/84 - 160/99)  Lipid Panel:

## 2021-01-11 NOTE — BH PATIENT PROFILE - NSBHHOMTHTS_PSY_A_CORE
Pt denies current H/I/I/P and denies any past thoughts to hurt others, however per [facility] collateral report pt has been aggressive and threatening at home/absent
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
## 2021-01-11 NOTE — BH PATIENT PROFILE - LIVES WITH
As per [facility] report collateral, pt living between shelter, mother's house and boyfriend's house/alone/domestic partner/parent(s)

## 2021-01-11 NOTE — BH CONSULTATION LIAISON PROGRESS NOTE - CURRENT MEDICATION
MEDICATIONS  (STANDING):  ARIPiprazole 15 milliGRAM(s) Oral at bedtime  FLUoxetine 20 milliGRAM(s) Oral daily    MEDICATIONS  (PRN):

## 2021-01-12 LAB
A1C WITH ESTIMATED AVERAGE GLUCOSE RESULT: 5.5 % — SIGNIFICANT CHANGE UP (ref 4–5.6)
CHOLEST SERPL-MCNC: 185 MG/DL — SIGNIFICANT CHANGE UP
ESTIMATED AVERAGE GLUCOSE: 111 MG/DL — SIGNIFICANT CHANGE UP (ref 68–114)
HDLC SERPL-MCNC: 34 MG/DL — LOW
LIPID PNL WITH DIRECT LDL SERPL: 121 MG/DL — HIGH
NON HDL CHOLESTEROL: 151 MG/DL — HIGH
TRIGL SERPL-MCNC: 148 MG/DL — SIGNIFICANT CHANGE UP

## 2021-01-12 PROCEDURE — 99232 SBSQ HOSP IP/OBS MODERATE 35: CPT

## 2021-01-12 RX ORDER — LITHIUM CARBONATE 300 MG/1
300 TABLET, EXTENDED RELEASE ORAL
Refills: 0 | Status: DISCONTINUED | OUTPATIENT
Start: 2021-01-12 | End: 2021-01-15

## 2021-01-12 RX ORDER — BENZTROPINE MESYLATE 1 MG
0.5 TABLET ORAL DAILY
Refills: 0 | Status: DISCONTINUED | OUTPATIENT
Start: 2021-01-12 | End: 2021-01-21

## 2021-01-12 RX ADMIN — Medication 5 MILLIGRAM(S): at 20:05

## 2021-01-12 RX ADMIN — ARIPIPRAZOLE 15 MILLIGRAM(S): 15 TABLET ORAL at 20:05

## 2021-01-12 RX ADMIN — LITHIUM CARBONATE 300 MILLIGRAM(S): 300 TABLET, EXTENDED RELEASE ORAL at 20:06

## 2021-01-12 NOTE — PSYCHIATRIC REHAB INITIAL EVALUATION - NSBHPRRECOMMEND_PSY_ALL_CORE
Writer met with patient in order to orient patient to unit, provide patient with a schedule, and introduce patient to psychiatric staff and department functions. Patient was verbal, polite, and cooperative with personal information. Writer collaborated with patient to select an appropriate psychiatric rehabilitation goal. Psychiatric rehabilitation staff will continue to engage patient daily in order to develop therapeutic rapport. In response to COVID19, unit programming will be re-evaluated on a consistent basis in effort to maintain safety guidelines.

## 2021-01-12 NOTE — BH INPATIENT PSYCHIATRY PROGRESS NOTE - NSBHFUPINTERVALHXFT_PSY_A_CORE
Patient seen for follow up for schizoaffective disorder, aggression towards family, chart reviewed, and case discussed with treatment team.  No events reported overnight and no PRN medication for agitation. The patient was visible on the unit, guarded, superficially engaged in the interview. The patient has poor insight into her illness but is accepting treatment. She continues to deny events leading up to her admission into the hospital and stated "I just have anxiety, none of that is true". The patient is possible minimalizing her symptoms to expedite discharge. She is discharge focused and constantly asking to leave. She denies perceptual AH, VH, TH. Internally preoccupied in the interview. Paranoia elicited. She denies SI, HI, intent and plan. Noted patient is attending and participating in group therapy.  Patient seen for follow up for schizoaffective disorder, aggression towards family, chart reviewed, and case discussed with treatment team.  No events reported overnight and no PRN medication for agitation. The patient was visible on the unit, guarded, superficially engaged in the interview. The patient has poor insight into her illness but is accepting treatment, "can you increase my medication?". She continues to deny events leading up to her admission into the hospital and stated "I just have anxiety, none of that is true". The patient is possible minimalizing her symptoms to expedite discharge. She is discharge focused and constantly asking to leave. She denies perceptual AH, VH, TH. Internally preoccupied in the interview. Paranoia elicited. She denies SI, HI, intent and plan. Noted patient is attending and participating in group therapy.

## 2021-01-13 PROCEDURE — 99232 SBSQ HOSP IP/OBS MODERATE 35: CPT

## 2021-01-13 RX ADMIN — ARIPIPRAZOLE 15 MILLIGRAM(S): 15 TABLET ORAL at 20:10

## 2021-01-13 RX ADMIN — Medication 5 MILLIGRAM(S): at 20:10

## 2021-01-13 RX ADMIN — LITHIUM CARBONATE 300 MILLIGRAM(S): 300 TABLET, EXTENDED RELEASE ORAL at 20:11

## 2021-01-13 RX ADMIN — LITHIUM CARBONATE 300 MILLIGRAM(S): 300 TABLET, EXTENDED RELEASE ORAL at 08:55
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
## 2021-01-13 NOTE — BH INPATIENT PSYCHIATRY PROGRESS NOTE - OTHER
at present appears linear, however presented with impaired reasoning  Flat. suspected delusions and paranoia. "good"

## 2021-01-14 PROCEDURE — 99232 SBSQ HOSP IP/OBS MODERATE 35: CPT

## 2021-01-14 RX ORDER — ARIPIPRAZOLE 15 MG/1
20 TABLET ORAL DAILY
Refills: 0 | Status: DISCONTINUED | OUTPATIENT
Start: 2021-01-15 | End: 2021-01-21

## 2021-01-14 RX ADMIN — LITHIUM CARBONATE 300 MILLIGRAM(S): 300 TABLET, EXTENDED RELEASE ORAL at 20:18

## 2021-01-14 RX ADMIN — LITHIUM CARBONATE 300 MILLIGRAM(S): 300 TABLET, EXTENDED RELEASE ORAL at 08:55

## 2021-01-14 RX ADMIN — Medication 5 MILLIGRAM(S): at 20:18

## 2021-01-14 NOTE — BH INPATIENT PSYCHIATRY PROGRESS NOTE - OTHER
Flat. "good" suspected delusions and paranoia. at present appears linear, however presented with impaired reasoning  Delayed response.

## 2021-01-14 NOTE — BH INPATIENT PSYCHIATRY PROGRESS NOTE - NSBHFUPINTERVALHXFT_PSY_A_CORE
Patient seen for follow up for schizoaffective disorder, aggression towards family, chart reviewed, and case discussed with treatment team.  No events reported overnight and no PRN medication required for agitation. The patient continues to be visible on the unit and in good behavorial control. Her mood is "I'm good". Her sleep and appetite are "good". She is cooperative with treatment and is agreeing to continue outpatient services. She denies perceptual AH, VH, TH. Internally preoccupied during the interview. Paranoia elicited. She denies SI, HI, intent and plan. Noted patient is attending and participating in group therapy. She is compliant with her medication regimen and denies side effects.

## 2021-01-15 LAB — LITHIUM SERPL-MCNC: 0.3 MMOL/L — LOW (ref 0.6–1.2)

## 2021-01-15 PROCEDURE — 99232 SBSQ HOSP IP/OBS MODERATE 35: CPT

## 2021-01-15 RX ORDER — LITHIUM CARBONATE 300 MG/1
300 TABLET, EXTENDED RELEASE ORAL DAILY
Refills: 0 | Status: DISCONTINUED | OUTPATIENT
Start: 2021-01-16 | End: 2021-01-20

## 2021-01-15 RX ORDER — LITHIUM CARBONATE 300 MG/1
600 TABLET, EXTENDED RELEASE ORAL AT BEDTIME
Refills: 0 | Status: DISCONTINUED | OUTPATIENT
Start: 2021-01-15 | End: 2021-01-19

## 2021-01-15 RX ADMIN — ARIPIPRAZOLE 20 MILLIGRAM(S): 15 TABLET ORAL at 09:07

## 2021-01-15 RX ADMIN — LITHIUM CARBONATE 300 MILLIGRAM(S): 300 TABLET, EXTENDED RELEASE ORAL at 09:07

## 2021-01-15 RX ADMIN — Medication 5 MILLIGRAM(S): at 20:37

## 2021-01-15 RX ADMIN — LITHIUM CARBONATE 600 MILLIGRAM(S): 300 TABLET, EXTENDED RELEASE ORAL at 20:36

## 2021-01-15 NOTE — BH PSYCHOLOGY - GROUP THERAPY NOTE - NSBHPSYCHOLRESPCOMMENT_PSY_A_CORE FT
Pt appeared adequately groomed and casually dressed. Pt appeared relatively engaged in the group discussion as evidenced by her willingness to engage in the group topic with prompting. During check-in, Pt shared that she enjoys walking on the beach, adding that she is able to listen to the water and walk on the sand. Pt was able to identify that “drinking more water” is a way that she can better take care of herself on the unit. Speech was WNL. PT was oriented X3. PT was appropriate with others.

## 2021-01-15 NOTE — BH INPATIENT PSYCHIATRY PROGRESS NOTE - OTHER
"I'm okay" suspected delusions and paranoia. Flat. Delayed response.  at present appears linear, however presented with impaired reasoning

## 2021-01-15 NOTE — BH PSYCHOLOGY - GROUP THERAPY NOTE - NSPSYCHOLGRPCOGPT_PSY_A_CORE FT
Patient attended Cognitive Behavioral Therapy Group. The group started with a brief check-in during which Pts recalled their favorite places to visit and why. The group then focused on the topic of mindfulness and acceptance and ways in which they are able to engage in coping skills on the unit.  explained concepts, reinforced participation, and engaged patients in the discussion.

## 2021-01-15 NOTE — BH PSYCHOLOGY - GROUP THERAPY NOTE - NSPSYCHOLGRPCOGINT_PSY_A_CORE
group members provided support/group members suggested positive behaviors/mindfulness skills taught/relaxation skills practiced/other..

## 2021-01-15 NOTE — BH SOCIAL WORK INITIAL PSYCHOSOCIAL EVALUATION - OTHER PAST PSYCHIATRIC HISTORY (INCLUDE DETAILS REGARDING ONSET, COURSE OF ILLNESS, INPATIENT/OUTPATIENT TREATMENT)
Patient is a 44yo female, single, has 2 teenage sons (per sister, not in her custody) unemployed, domiciled intermittently with mom vs. homeless shelter, PPH of SAD and 5 past inpatient psych hospitalizations, most recently at [facility] in 2017, no past SAs, who presents to [facility] ED BIB sister for 1 week of worsening bizarre behavior, aggression towards family members, and suicidal statements.

## 2021-01-15 NOTE — BH SOCIAL WORK INITIAL PSYCHOSOCIAL EVALUATION - NSBHCHILDAGEFT_PSY_ALL_CORE
Patient has 2 teenage sons (per sister, not in her custody). 17 year old son lives with pt and mother, 12 year old son lives with the father.

## 2021-01-15 NOTE — BH INPATIENT PSYCHIATRY PROGRESS NOTE - NSBHFUPINTERVALHXFT_PSY_A_CORE
Patient seen for follow up for schizoaffective disorder, aggression towards family, chart reviewed, and case discussed with treatment team.  No events reported overnight and no PRN medication required for agitation. The patient reported her mood as "I'm okay". The patient continues to be visible on the unit and in good behavorial control. Her sleep and appetite are "good". Today, the patient is discharge focused. She denies perceptual AH, VH, TH. Internally preoccupied during the interview. Paranoia elicited. She denies SI, HI, intent and plan. She is compliant with her medication regimen and denies side effects.

## 2021-01-16 RX ADMIN — Medication 5 MILLIGRAM(S): at 20:12

## 2021-01-16 RX ADMIN — LITHIUM CARBONATE 300 MILLIGRAM(S): 300 TABLET, EXTENDED RELEASE ORAL at 08:53

## 2021-01-16 RX ADMIN — ARIPIPRAZOLE 20 MILLIGRAM(S): 15 TABLET ORAL at 08:53

## 2021-01-16 RX ADMIN — LITHIUM CARBONATE 600 MILLIGRAM(S): 300 TABLET, EXTENDED RELEASE ORAL at 20:12

## 2021-01-17 RX ADMIN — LITHIUM CARBONATE 300 MILLIGRAM(S): 300 TABLET, EXTENDED RELEASE ORAL at 09:00

## 2021-01-17 RX ADMIN — LITHIUM CARBONATE 600 MILLIGRAM(S): 300 TABLET, EXTENDED RELEASE ORAL at 20:17

## 2021-01-17 RX ADMIN — ARIPIPRAZOLE 20 MILLIGRAM(S): 15 TABLET ORAL at 09:00

## 2021-01-17 RX ADMIN — Medication 5 MILLIGRAM(S): at 20:16

## 2021-01-18 RX ADMIN — Medication 5 MILLIGRAM(S): at 20:01

## 2021-01-18 RX ADMIN — ARIPIPRAZOLE 20 MILLIGRAM(S): 15 TABLET ORAL at 08:56

## 2021-01-18 RX ADMIN — LITHIUM CARBONATE 600 MILLIGRAM(S): 300 TABLET, EXTENDED RELEASE ORAL at 20:01

## 2021-01-18 RX ADMIN — LITHIUM CARBONATE 300 MILLIGRAM(S): 300 TABLET, EXTENDED RELEASE ORAL at 08:56

## 2021-01-19 LAB
ALBUMIN SERPL ELPH-MCNC: 3.8 G/DL — SIGNIFICANT CHANGE UP (ref 3.3–5)
ALP SERPL-CCNC: 55 U/L — SIGNIFICANT CHANGE UP (ref 40–120)
ALT FLD-CCNC: 14 U/L — SIGNIFICANT CHANGE UP (ref 4–33)
ANION GAP SERPL CALC-SCNC: 10 MMOL/L — SIGNIFICANT CHANGE UP (ref 7–14)
AST SERPL-CCNC: 14 U/L — SIGNIFICANT CHANGE UP (ref 4–32)
BASOPHILS # BLD AUTO: 0.05 K/UL — SIGNIFICANT CHANGE UP (ref 0–0.2)
BASOPHILS NFR BLD AUTO: 0.5 % — SIGNIFICANT CHANGE UP (ref 0–2)
BILIRUB SERPL-MCNC: 0.5 MG/DL — SIGNIFICANT CHANGE UP (ref 0.2–1.2)
BUN SERPL-MCNC: 9 MG/DL — SIGNIFICANT CHANGE UP (ref 7–23)
CALCIUM SERPL-MCNC: 9.4 MG/DL — SIGNIFICANT CHANGE UP (ref 8.4–10.5)
CHLORIDE SERPL-SCNC: 104 MMOL/L — SIGNIFICANT CHANGE UP (ref 98–107)
CO2 SERPL-SCNC: 25 MMOL/L — SIGNIFICANT CHANGE UP (ref 22–31)
CREAT SERPL-MCNC: 0.71 MG/DL — SIGNIFICANT CHANGE UP (ref 0.5–1.3)
EOSINOPHIL # BLD AUTO: 0.24 K/UL — SIGNIFICANT CHANGE UP (ref 0–0.5)
EOSINOPHIL NFR BLD AUTO: 2.2 % — SIGNIFICANT CHANGE UP (ref 0–6)
GLUCOSE SERPL-MCNC: 149 MG/DL — HIGH (ref 70–99)
HCT VFR BLD CALC: 39 % — SIGNIFICANT CHANGE UP (ref 34.5–45)
HGB BLD-MCNC: 12.3 G/DL — SIGNIFICANT CHANGE UP (ref 11.5–15.5)
IANC: 7.45 K/UL — SIGNIFICANT CHANGE UP (ref 1.5–8.5)
IMM GRANULOCYTES NFR BLD AUTO: 0.5 % — SIGNIFICANT CHANGE UP (ref 0–1.5)
LITHIUM SERPL-MCNC: 0.4 MMOL/L — LOW (ref 0.6–1.2)
LYMPHOCYTES # BLD AUTO: 2.6 K/UL — SIGNIFICANT CHANGE UP (ref 1–3.3)
LYMPHOCYTES # BLD AUTO: 24.2 % — SIGNIFICANT CHANGE UP (ref 13–44)
MCHC RBC-ENTMCNC: 25.7 PG — LOW (ref 27–34)
MCHC RBC-ENTMCNC: 31.5 GM/DL — LOW (ref 32–36)
MCV RBC AUTO: 81.4 FL — SIGNIFICANT CHANGE UP (ref 80–100)
MONOCYTES # BLD AUTO: 0.35 K/UL — SIGNIFICANT CHANGE UP (ref 0–0.9)
MONOCYTES NFR BLD AUTO: 3.3 % — SIGNIFICANT CHANGE UP (ref 2–14)
NEUTROPHILS # BLD AUTO: 7.45 K/UL — HIGH (ref 1.8–7.4)
NEUTROPHILS NFR BLD AUTO: 69.3 % — SIGNIFICANT CHANGE UP (ref 43–77)
NRBC # BLD: 0 /100 WBCS — SIGNIFICANT CHANGE UP
NRBC # FLD: 0 K/UL — SIGNIFICANT CHANGE UP
PLATELET # BLD AUTO: 348 K/UL — SIGNIFICANT CHANGE UP (ref 150–400)
POTASSIUM SERPL-MCNC: 4 MMOL/L — SIGNIFICANT CHANGE UP (ref 3.5–5.3)
POTASSIUM SERPL-SCNC: 4 MMOL/L — SIGNIFICANT CHANGE UP (ref 3.5–5.3)
PROT SERPL-MCNC: 7.4 G/DL — SIGNIFICANT CHANGE UP (ref 6–8.3)
RBC # BLD: 4.79 M/UL — SIGNIFICANT CHANGE UP (ref 3.8–5.2)
RBC # FLD: 16.1 % — HIGH (ref 10.3–14.5)
SODIUM SERPL-SCNC: 139 MMOL/L — SIGNIFICANT CHANGE UP (ref 135–145)
WBC # BLD: 10.74 K/UL — HIGH (ref 3.8–10.5)
WBC # FLD AUTO: 10.74 K/UL — HIGH (ref 3.8–10.5)

## 2021-01-19 PROCEDURE — 99232 SBSQ HOSP IP/OBS MODERATE 35: CPT | Mod: 25

## 2021-01-19 PROCEDURE — 90853 GROUP PSYCHOTHERAPY: CPT

## 2021-01-19 RX ORDER — LITHIUM CARBONATE 300 MG/1
900 TABLET, EXTENDED RELEASE ORAL AT BEDTIME
Refills: 0 | Status: DISCONTINUED | OUTPATIENT
Start: 2021-01-19 | End: 2021-01-20

## 2021-01-19 RX ADMIN — LITHIUM CARBONATE 300 MILLIGRAM(S): 300 TABLET, EXTENDED RELEASE ORAL at 09:49

## 2021-01-19 RX ADMIN — LITHIUM CARBONATE 900 MILLIGRAM(S): 300 TABLET, EXTENDED RELEASE ORAL at 20:24

## 2021-01-19 RX ADMIN — ARIPIPRAZOLE 20 MILLIGRAM(S): 15 TABLET ORAL at 09:48

## 2021-01-19 RX ADMIN — Medication 5 MILLIGRAM(S): at 20:23

## 2021-01-19 NOTE — BH INPATIENT PSYCHIATRY PROGRESS NOTE - OTHER
at present appears linear, however presented with impaired reasoning  Delayed response.  suspected delusions and paranoia. Flat. "Good"

## 2021-01-19 NOTE — BH INPATIENT PSYCHIATRY PROGRESS NOTE - NSBHFUPINTERVALHXFT_PSY_A_CORE
Patient seen for follow up for schizoaffective disorder, aggression towards family, chart reviewed, and case discussed with treatment team.  No events reported overnight/ over the weekend and no PRN medication required for agitation. The patient reported her mood as "good". She is visible on the unit and in good behavorial control. Her sleep and appetite are "good". She denies perceptual AH, VH, TH. Suspected internal stimuli and delayed response. Paranoia elicited. She denies SI, HI, intent and plan. She is compliant with her medication regimen and denies side effects. She remains discharge focused "When will I leave". The writer discussed discharge plans and treatment plans with the patient.

## 2021-01-20 PROCEDURE — 99232 SBSQ HOSP IP/OBS MODERATE 35: CPT

## 2021-01-20 RX ORDER — LITHIUM CARBONATE 300 MG/1
2 TABLET, EXTENDED RELEASE ORAL
Qty: 28 | Refills: 0
Start: 2021-01-20 | End: 2021-02-02

## 2021-01-20 RX ORDER — LITHIUM CARBONATE 300 MG/1
1200 TABLET, EXTENDED RELEASE ORAL AT BEDTIME
Refills: 0 | Status: DISCONTINUED | OUTPATIENT
Start: 2021-01-21 | End: 2021-01-21

## 2021-01-20 RX ORDER — ARIPIPRAZOLE 15 MG/1
1 TABLET ORAL
Qty: 0 | Refills: 0 | DISCHARGE
Start: 2021-01-20

## 2021-01-20 RX ORDER — ARIPIPRAZOLE 15 MG/1
1 TABLET ORAL
Qty: 14 | Refills: 0
Start: 2021-01-20 | End: 2021-02-02

## 2021-01-20 RX ORDER — BENZTROPINE MESYLATE 1 MG
1 TABLET ORAL
Qty: 0 | Refills: 0 | DISCHARGE
Start: 2021-01-20

## 2021-01-20 RX ORDER — BENZTROPINE MESYLATE 1 MG
1 TABLET ORAL
Qty: 14 | Refills: 0
Start: 2021-01-20 | End: 2021-02-02

## 2021-01-20 RX ORDER — LITHIUM CARBONATE 300 MG/1
2 TABLET, EXTENDED RELEASE ORAL
Qty: 0 | Refills: 0 | DISCHARGE
Start: 2021-01-20

## 2021-01-20 RX ORDER — LITHIUM CARBONATE 300 MG/1
900 TABLET, EXTENDED RELEASE ORAL ONCE
Refills: 0 | Status: COMPLETED | OUTPATIENT
Start: 2021-01-20 | End: 2021-01-20

## 2021-01-20 RX ADMIN — LITHIUM CARBONATE 300 MILLIGRAM(S): 300 TABLET, EXTENDED RELEASE ORAL at 08:22

## 2021-01-20 RX ADMIN — Medication 5 MILLIGRAM(S): at 20:13

## 2021-01-20 RX ADMIN — LITHIUM CARBONATE 900 MILLIGRAM(S): 300 TABLET, EXTENDED RELEASE ORAL at 20:13

## 2021-01-20 RX ADMIN — ARIPIPRAZOLE 20 MILLIGRAM(S): 15 TABLET ORAL at 08:22

## 2021-01-20 NOTE — BH TREATMENT PLAN - NSTXDCOTHRINTERSW_PSY_ALL_CORE
SW will provide support, insight, discharge planning, psycho-education, and maintain contact with identified supports

## 2021-01-20 NOTE — BH DISCHARGE NOTE NURSING/SOCIAL WORK/PSYCH REHAB - NSCDUDCCRISIS_PSY_A_CORE
[facility] Well  1 [phone]) [facility]-WELL ([phone])  Text "WELL" to [phone]  Website: www.[employer]/.[employer] [facility] 1 (903) 251-SMSX (0103) Website: www.safehorizon.org/.National Suicide Prevention Lifeline 2 (616) 354-8385/.  [employer]  1 (734) LIF[employer] (094-6482)/.  [facility]  [address] [place] [phone]   Hours:  Monday through Friday from 9 AM to 3 PM/.  [employer] - [facility] Crisis Line  6 [phone], Option 1

## 2021-01-20 NOTE — BH INPATIENT PSYCHIATRY PROGRESS NOTE - NSBHFUPINTERVALHXFT_PSY_A_CORE
Patient seen for follow up for schizoaffective disorder, aggression towards family, chart reviewed, and case discussed with treatment team.  No events reported overnight no PRN medication required for agitation. The patient is visible on the unit and attends group therapy regularly. The patient reported her mood as "good". Her sleep and appetite are "good". She denies perceptual AH, VH, TH. Suspected internal stimuli and delayed response. Paranoia elicited. She denies SI, HI, intent and plan. She is compliant with her medication regimen and denies side effects. Safety plans and discharge plans discussed with the patient's sister today as per social work.

## 2021-01-20 NOTE — BH TREATMENT PLAN - NSTXPATIENTPARTICIPATE_PSY_ALL_CORE
Patient participated in identification of needs/problems/goals for treatment
Patient participated in identification of needs/problems/goals for treatment

## 2021-01-20 NOTE — BH DISCHARGE NOTE NURSING/SOCIAL WORK/PSYCH REHAB - PATIENT PORTAL LINK FT
You can access the FollowMyHealth Patient Portal offered by [facility] by registering at the following website: [email or web address]. By joining [employer]s FollowMyHealth portal, you will also be able to view your health information using other applications (apps) compatible with our system.

## 2021-01-20 NOTE — BH TREATMENT PLAN - NSTXCAREGIVERPARTICIPATE_PSY_P_CORE
Family/Caregiver participated in identification of needs/problems/goals for treatment/Family/Caregiver participated in defining interventions
Family refusing for the patient to return home and prefers a shelter./Family/Caregiver participated in defining interventions

## 2021-01-20 NOTE — BH TREATMENT PLAN - NSTXPLANTHERAPYSESSIONSFT_PSY_ALL_CORE
01-19-21  Type of therapy: Peer advocate,Relapse prevention  Type of session: Individual  Level of patient participation: Engaged  Duration of participation: 15 minutes  Therapy conducted by: Psych rehab  Therapy Summary: During session, patient stated that her mood as improved and she no longer endorses high anxiety. Writer provided support. Patient stated she is currently discharge oriented and is hoping to leave soon. Writer provided support and encouraged patient to continue engaging in coping skills on unit.     Patient has attended approximately 80% of psych rehab groups during the last seven days.

## 2021-01-20 NOTE — BH INPATIENT PSYCHIATRY PROGRESS NOTE - OTHER
Flat. "Good" Delayed response.  suspected delusions and paranoia. at present appears linear, however presented with impaired reasoning

## 2021-01-20 NOTE — BH TREATMENT PLAN - NSDCCRITERIA_PSY_ALL_CORE
Sx reduction, medication management, safety planning, milieu therapy, outpatient psychiatric care.
Sx reduction, medication management, safety planning, milieu therapy, outpatient psychiatric care.

## 2021-01-20 NOTE — BH DISCHARGE NOTE NURSING/SOCIAL WORK/PSYCH REHAB - NSDCPRGOAL_PSY_ALL_CORE
Patient has made fair progress towards goal during current hospitalization. Patient identified going for walks, completing word search puzzles, and reading as positive coping skills. Writer provided support and encouraged patient to continue utilizing skills post discharge. Patient was receptive.     On the unit, patient was engaged in milieu. Patient stated that she is hopeful in regards to discharge and follow up treatment. Patient and writer completed p[atient safety planning form during session. Patient denies SI/HI    Patient attended approximately 85% of psych rehab groups during current hospitalization. Patient was verbally engaged and able to tolerate session structure.     Patient was provided with press ganey survey.

## 2021-01-20 NOTE — BH TREATMENT PLAN - NSTXDCHOUSINTERMD_PSY_ALL_CORE
The patient will be offered shelter accommodations since her family reports aggression towards family. 
The patient will be offered shelter accommodations since her family reports aggression towards family.

## 2021-01-20 NOTE — BH TREATMENT PLAN - NSTXDCOTHRGOAL_PSY_ALL_CORE
Pt will show a reduction of disorganized behaviors and engage meaningfully with writer to identify a safe discharge plan.

## 2021-01-20 NOTE — BH TREATMENT PLAN - NSTXANXINTERPR_PSY_ALL_CORE
Pt has made fair progress, as patient has identified reading, oging fo rwalks, and doing word search puzzles as positive coping skills. Writer provided support and encouragement and encouraged patient to utilize positive coping skills post discharge. Patient was receptive.
Patient’s psychiatric rehabilitation goal is to meet with staff individually and attend psychiatric rehabilitation groups, in order for patient to identify 3 healthy coping skills to manage anxiety and sustained recovery within 7 days.

## 2021-01-20 NOTE — BH TREATMENT PLAN - ANXIETY/PANIC/FEAR NURSING INTERVENTIONS/RECOMMENDATIONS
Patient is encouraged to continue treatment compliance
Patient is encouraged to continue treatment compliance

## 2021-01-20 NOTE — BH TREATMENT PLAN - NSTXANXGOAL_PSY_ALL_CORE
Identify and practice 3 coping skills to manage anxiety
Identify and practice 3 coping skills to manage anxiety

## 2021-01-21 VITALS — TEMPERATURE: 99 F | DIASTOLIC BLOOD PRESSURE: 96 MMHG | HEART RATE: 100 BPM | SYSTOLIC BLOOD PRESSURE: 126 MMHG

## 2021-01-21 PROCEDURE — 99238 HOSP IP/OBS DSCHRG MGMT 30/<: CPT

## 2021-01-21 RX ADMIN — ARIPIPRAZOLE 20 MILLIGRAM(S): 15 TABLET ORAL at 08:15

## 2021-01-21 NOTE — BH INPATIENT PSYCHIATRY PROGRESS NOTE - NSTXANXGOAL_PSY_ALL_CORE
Identify and practice 3 coping skills to manage anxiety

## 2021-01-21 NOTE — BH INPATIENT PSYCHIATRY DISCHARGE NOTE - HOSPITAL COURSE
*** Hospitalization dates 1/11/2021- 1/21/2021   ED note: “Patient is a 44yo female, single, has 2 teenage sons (per sister, not in her custody) unemployed, domiciled intermittently with mom vs. homeless shelter, PPH of SAD and 5 past inpatient psych hospitalizations, most recently at [facility] in 2017, no past SAs, who presents to [facility] ED BIB sister for 1 week of worsening bizarre behavior, aggression towards family members, and suicidal statements.   Patient was seen and assessed in  ED today, upon interview, she appears somewhat guarded, looking around the room suspiciously, somewhat evasive and defensive at times. Intense eye contact. However, she is cooperative with interview and in good behavioral control. Patient says that her sister brought her to the ED because "she wants me out of the house" and is unable to elaborate. 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 Per patient, she has been living with her mom and her 18yo son in mom's house. Patient also has a 13yo son who is domiciled with his father. Patient claims that she has joint custody of 13yo son. When asked about what school her 18yo attends, patient says "I'd rather not answer that" and appears suspicious/guarded.  Patient denies depressed mood and denies SI/HI. No access to guns. She reports good sleep lately (sleeping more than usual) with somewhat low energy during the day. Fair appetite. She denies AVH/delusions. She denies having access to guns. Patient also denies alcohol and/or illicit drug use. Denies tobacco use. Reports some anxiety about not having a job but no panic attacks. Patient says she is under the outpatient psychiatric care of Delia Grant at [facility] - she is prescribed Abilify 15mg daily, Prozac 40mg daily, and was also recently started on Risperdal by Delia Grant. Patient unaware of Risperdal dose (she first says she has been taking 10mg Risperdal but then becomes unsure). She also reports seeing a therapist Dr. Mccracken every other week”.  Immediate risk was minimized by inpatient admission to a safe environment with appropriate supervision. The patient's sister Stephany ([phone]) reported that patient implied she would burn the house down - patient denies this. Patient also denies any desire to harm self or others. She says she wants to go home because "I have things I need to do." Per her, patient has had approx 1 week of worsening bizarre behavior, coming in and out of mom's house, being aggressive with family. She recently threw the TV remote at sister. The patient made statements "I want to kill myself" and then took 6 tabs of tylenol in front of her mom. In addition, she made menacing gestures at mom. The patient has also made vague statements such as "wait and see what happens to her - she's not gonna have a house" when speaking about her other sister. She has been repeating "why did I ever have kids" and "I want to kill myself." Patient's sister Stephany believes she has been living between her mom's house, a shelter, and possibly various boyfriends' houses. Patient's family including her 2 sons are reportedly afraid of what she may do and do not feel safe being around her. She also recently left the oven and stove on, set off the smoke alarm in the house, and did not have an explanation for why this happened. No suicide attempts in the past, but patient has a history of bizarre behavior with family and aggressive/menacing behavior.  Behavior: While inpatient, the patient was visible on the unit, guarded, superficially engaged in the interview. The patient had poor insight into her illness but was accepting treatment. She continued to deny events leading up to her admission into the hospital and stated "I just have anxiety, none of that is true". The patient was possibly minimalizing her symptoms to expedite discharge. She was discharge focused and constantly asking to leave. She denied perceptual AH, VH, TH. Internally preoccupied in the interview. Paranoia elicited. She denied SI, HI, intent and plan. She did not require PRN medication, seclusion room or restraints for agitation. The patient was compliant with her medication regimen and attended group therapy. The patient noted she gained insight from the group therapy sessions on how to maintain her behavior when discharge.   Treatment: The patient was continued with home medication Aripiprazole 15mg po daily and it was increased to 20mg. Lithium was added at 300mg po twice a day and it was titrated to 300mg po daily and 900mg po at bedtime. Lithium level on 1/19 was 0.4. Lithium was then titrated to 1200mg at bedtime. The patient tolerated this medication regimen well and denied side effects. She noted symptom improvement with medication. The patient was discharged with medication regimen as follows: Aripiprazole 20mg po daily, Benztropine 0.5mg po daily as needed and Lithium 1200mg po at bedtime. The patient will follow-up with her outpatient at the [facility] and [facility] at [facility].  On discharge: The patient seen for follow up for schizoaffective disorder, aggression towards family, chart reviewed, and case discussed with treatment team.  No events reported overnight and no PRN medication required for agitation. The patient was visible on the unit with minimal social contact. She attended group therapy regularly and verbalized learning coping skills. Her mood was "good", "I am ready to go home". Her sleep and appetite were "good". She denied perceptual AH, VH, TH. Suspected internal stimuli and delayed response. Paranoia elicited. She denied SI, HI, intent and plan. She was compliant with her medication regimen and denied side effects. Safety plans and discharge plans discussed. The patient was accompanied home by her sister.  MSE: Level of Consciousness-Alert, General Appearance-Well developed, Body Habitus-Well nourished, Hygiene-Fair, Grooming-Fair, Behavior-Cooperative, Eye Contact- Fair, Relatedness-Poor, Impulse Control-Normal, Muscle Tone/Strength-Normal muscle tone/strength, Abnormal Movements-No abnormal movements, Gait/Station-Normal gait / station, Speech Abnormality-Delayed response, Mood- "Good", Affect Quality-Flat, Affect Range- Blunted, Affect Congruence- Congruent, Thought Process- appears linear, however presented with impaired reasoning, Thought Associations-Normal, Thought Content-suspected delusions and paranoia, Perceptions-No abnormalities, Orientation-Oriented to time, place, person, situation, Attention/Concentration- Impaired, Estimated Intelligence-Average, Recent Memory-Normal, Remote Memory- Impaired, Language-No abnormalities noted, Judgment-Fair, Insight-Poor.  Patient was provided with extensive psychoeducation on treatment options and motivational counseling targeting healthy lifestyle. Patient was instructed on actions for crisis situations, understood and agreed to follow instructions for handling crisis, including coming to ER or calling 911 should the patient or their family feel that they are in danger of hurting self or others. Patient also was given Suicide Prevention Lifeline number 1-838.433.9334 and provided with instructions on its use.   Plan:   -Aripiprazole 20mg po daily.  -Benztropine 0.5mg po daily as needed.   -Lithium 1200mg po at bedtime.  -Outpatient follow-up and [facility].   For further collateral information, please contact POLLY Beckett at [phone] or [phone], email: [email or web address].

## 2021-01-21 NOTE — BH INPATIENT PSYCHIATRY PROGRESS NOTE - NSCGIIMPROVESX_PSY_ALL_CORE
3 = Minimally improved - slightly better with little or no clinically meaningful reduction of symptoms.  Represents very little change in basic clinical status, level of care, or functional capacity.

## 2021-01-21 NOTE — BH INPATIENT PSYCHIATRY PROGRESS NOTE - NSDCCRITERIA_PSY_ALL_CORE
Sx reduction, medication management, safety planning, milieu therapy, outpatient psychiatric care.

## 2021-01-21 NOTE — BH INPATIENT PSYCHIATRY PROGRESS NOTE - NSICDXBHTERTIARYDX_PSY_ALL_CORE
Aggression   R46.97  
Aggression   R46.84  
Aggression   R46.17  
Aggression   R46.22  
Aggression   R46.90  
Aggression   R46.88  
Aggression   R46.83

## 2021-01-21 NOTE — BH INPATIENT PSYCHIATRY PROGRESS NOTE - NSBHPSYCHOLCOGORIENT_PSY_A_CORE
Oriented to time, place, person, situation

## 2021-01-21 NOTE — BH INPATIENT PSYCHIATRY PROGRESS NOTE - NSTXPROBDCHOUS_PSY_ALL_CORE
DISCHARGE ISSUE - LACK OF APPROPRIATE HOUSING

## 2021-01-21 NOTE — BH INPATIENT PSYCHIATRY DISCHARGE NOTE - HPI (INCLUDE ILLNESS QUALITY, SEVERITY, DURATION, TIMING, CONTEXT, MODIFYING FACTORS, ASSOCIATED SIGNS AND SYMPTOMS)
Patient is a 46yo female, single, has 2 teenage sons (per sister, not in her custody) unemployed, domiciled intermittently with mom vs. homeless shelter, PPH of SAD and 5 past inpatient psych hospitalizations, most recently at [facility] in 2017, no past SAs, who presents to [facility] ED BIB sister for 1 week of worsening bizarre behavior, aggression towards family members, and suicidal statements.     Patient was seen and assessed in  ED today, upon interview, she appears somewhat guarded, looking around the room suspiciously, somewhat evasive and defensive at times. Intense eye contact. However, she is cooperative with interview and in good behavioral control. Patient says that her sister brought her to the ED because "she wants me out of the house" and is unable to elaborate. 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 Per patient, she has been living with her mom and her 16yo son in mom's house. Patient also has a 13yo son who is domiciled with his father. Patient claims that she has joint custody of 13yo son. When asked about what school her 16yo attends, patient says "I'd rather not answer that" and appears suspicious/guarded.     Patient denies depressed mood and denies SI/HI. No access to guns. She reports good sleep lately (sleeping more than usual) with somewhat low energy during the day. Fair appetite. She denies AVH/delusions. She denies having access to guns. Patient also denies alcohol and/or illicit drug use. Denies tobacco use. Reports some anxiety about not having a job but no panic attacks. Patient says she is under the outpatient psychiatric care of Delia Grant at [facility] - she is prescribed Abilify 15mg daily, Prozac 40mg daily, and was also recently started on Risperdal by Delia Grant. Patient unaware of Risperdal dose (she first says she has been taking 10mg Risperdal but then becomes unsure). She also reports seeing a therapist Dr. Mccracken every other week.     Of note, patient's sister Stephany reported that patient implied she would burn the house down - patient denies this. Patient also denies any desire to harm self or others. She says she wants to go home because "I have things I need to do."     Collateral was obtained from patient's sister Stephany ([phone]). Per her, patient has had approx 1 week of worsening bizarre behavior, coming in and out of mom's house, being aggressive with family. She recently threw the TV remote at sister. This AM, patient made statement "I want to kill myself" and then took 6 tabs of tylenol in front of her mom. In addition, she made menacing gestures at mom. This week, patient has also made vague statements such as "wait and see what happens to her - she's not gonna have a house" when speaking about her other sister. She has been repeating "why did I ever have kids" and "I want to kill myself." Patient's sister Stephany believes she has been living between her mom's house, a shelter, and possibly various boyfriends' houses. Patient's family including her 2 sons are reportedly afraid of what she may do and do not feel safe being around her. She also recently left the oven and stove on, set off the smoke alarm in the house, and did not have an explanation for why this happened. No suicide attempts in the past, but patient has a history of bizarre behavior with family and aggressive/menacing behavior.

## 2021-01-21 NOTE — BH INPATIENT PSYCHIATRY PROGRESS NOTE - NSTXDCHOUSINTERMD_PSY_ALL_CORE
The patient will be offered shelter accommodations since her family reports aggression towards family. 
none
The patient will be offered shelter accommodations since her family reports aggression towards family. 

## 2021-01-21 NOTE — BH INPATIENT PSYCHIATRY DISCHARGE NOTE - OTHER PAST PSYCHIATRIC HISTORY (INCLUDE DETAILS REGARDING ONSET, COURSE OF ILLNESS, INPATIENT/OUTPATIENT TREATMENT)
Patient is a 46yo female, single, has 2 teenage sons (per sister, not in her custody) unemployed, domiciled intermittently with mom vs. homeless shelter, PPH of SAD and 5 past inpatient psych hospitalizations, most recently at [facility] in 2017, no past SAs, who presents to [facility] ED BIB sister for 1 week of worsening bizarre behavior, aggression towards family members, and suicidal statements.

## 2021-01-21 NOTE — BH INPATIENT PSYCHIATRY PROGRESS NOTE - NSBHMSEREMMEM_PSY_A_CORE
Impaired
Unable to assess

## 2021-01-21 NOTE — BH INPATIENT PSYCHIATRY PROGRESS NOTE - NSBHCHARTREVIEWVS_PSY_A_CORE FT
Vital Signs Last 24 Hrs  T(C): 36.6 (14 Jan 2021 06:37), Max: 36.8 (13 Jan 2021 14:26)  T(F): 97.8 (14 Jan 2021 06:37), Max: 98.2 (13 Jan 2021 14:26)  HR: --93  BP: --147/83  BP(mean): --  RR: 18 (13 Jan 2021 21:38) (18 - 18)  SpO2: 100% (13 Jan 2021 21:38) (100% - 100%)
Vital Signs Last 24 Hrs  T(C): 36.1 (19 Jan 2021 06:48), Max: 36.6 (18 Jan 2021 19:28)  T(F): 97 (19 Jan 2021 06:48), Max: 97.8 (18 Jan 2021 19:28)  HR: --94  BP: --131/71  BP(mean): --  RR: 18 (18 Jan 2021 21:06) (18 - 18)  SpO2: 100% (18 Jan 2021 21:06) (100% - 100%)
Vital Signs Last 24 Hrs  T(C): 36.2 (20 Jan 2021 14:19), Max: 37 (19 Jan 2021 19:49)  T(F): 97.2 (20 Jan 2021 14:19), Max: 98.6 (19 Jan 2021 19:49)  HR: 90 (20 Jan 2021 08:18) (90 - 90)  BP: 137/89 (20 Jan 2021 08:18) (137/89 - 137/89)  BP(mean): --  RR: 16 (19 Jan 2021 22:55) (16 - 16)  SpO2: 98% (19 Jan 2021 22:55) (98% - 98%)
Vital Signs Last 24 Hrs  T(C): 36.1 (13 Jan 2021 07:49), Max: 36.7 (12 Jan 2021 19:40)  T(F): 96.9 (13 Jan 2021 07:49), Max: 98 (12 Jan 2021 19:40)  HR: --101  BP: --126/80  BP(mean): --  RR: 18 (12 Jan 2021 19:40) (18 - 18)  SpO2: 99% (12 Jan 2021 19:40) (99% - 99%)
Vital Signs Last 24 Hrs  T(C): 36.3 (12 Jan 2021 14:06), Max: 36.6 (11 Jan 2021 19:45)  T(F): 97.4 (12 Jan 2021 14:06), Max: 97.8 (11 Jan 2021 19:45)  HR: --100  BP: --114/73  BP(mean): --  RR: 18 (11 Jan 2021 19:45) (18 - 18)  SpO2: 99% (11 Jan 2021 19:45) (99% - 99%)
Vital Signs Last 24 Hrs  T(C): 37.1 (21 Jan 2021 08:15), Max: 37.1 (21 Jan 2021 08:15)  T(F): 98.7 (21 Jan 2021 08:15), Max: 98.7 (21 Jan 2021 08:15)  HR: 100 (21 Jan 2021 08:15) (100 - 100)  BP: 126/96 (21 Jan 2021 08:15) (126/96 - 126/96)  BP(mean): --  RR: --  SpO2: --
Vital Signs Last 24 Hrs  T(C): 36.5 (15 Olivier 2021 08:20), Max: 36.6 (14 Jan 2021 14:35)  T(F): 97.7 (15 Olivier 2021 08:20), Max: 97.8 (14 Jan 2021 14:35)  HR: --105  BP: --135/69  BP(mean): --  RR: 18 (14 Jan 2021 22:38) (18 - 18)  SpO2: 100% (14 Jan 2021 22:38) (100% - 100%)

## 2021-01-21 NOTE — BH INPATIENT PSYCHIATRY PROGRESS NOTE - NSBHMETABOLIC_PSY_ALL_CORE_FT
BMI: BMI (kg/m2): 37.6 (01-11-21 @ 12:50)  HbA1c: A1C with Estimated Average Glucose Result: 5.5 % (01-12-21 @ 10:00)    Glucose:   BP: --  Lipid Panel: Date/Time: 01-12-21 @ 10:00  Cholesterol, Serum: 185  Direct LDL: --  HDL Cholesterol, Serum: 34  Total Cholesterol/HDL Ration Measurement: --  Triglycerides, Serum: 148  
BMI: BMI (kg/m2): 37.6 (01-11-21 @ 12:50)  HbA1c: A1C with Estimated Average Glucose Result: 5.5 % (01-12-21 @ 10:00)    Glucose:   BP: 128/81 (01-11-21 @ 11:17) (125/84 - 160/99)  Lipid Panel: Date/Time: 01-12-21 @ 10:00  Cholesterol, Serum: 185  Direct LDL: --  HDL Cholesterol, Serum: 34  Total Cholesterol/HDL Ration Measurement: --  Triglycerides, Serum: 148  
BMI: BMI (kg/m2): 37.6 (01-11-21 @ 12:50)  HbA1c: A1C with Estimated Average Glucose Result: 5.5 % (01-12-21 @ 10:00)    Glucose:   BP: 137/89 (01-20-21 @ 08:18) (131/82 - 137/89)  Lipid Panel: Date/Time: 01-12-21 @ 10:00  Cholesterol, Serum: 185  Direct LDL: --  HDL Cholesterol, Serum: 34  Total Cholesterol/HDL Ration Measurement: --  Triglycerides, Serum: 148  
BMI: BMI (kg/m2): 37.6 (01-11-21 @ 12:50)  HbA1c: A1C with Estimated Average Glucose Result: 5.5 % (01-12-21 @ 10:00)    Glucose:   BP: 126/96 (01-21-21 @ 08:15) (126/96 - 137/89)  Lipid Panel: Date/Time: 01-12-21 @ 10:00  Cholesterol, Serum: 185  Direct LDL: --  HDL Cholesterol, Serum: 34  Total Cholesterol/HDL Ration Measurement: --  Triglycerides, Serum: 148  
BMI: BMI (kg/m2): 37.6 (01-11-21 @ 12:50)  HbA1c: A1C with Estimated Average Glucose Result: 5.5 % (01-12-21 @ 10:00)    Glucose:   BP: --  Lipid Panel: Date/Time: 01-12-21 @ 10:00  Cholesterol, Serum: 185  Direct LDL: --  HDL Cholesterol, Serum: 34  Total Cholesterol/HDL Ration Measurement: --  Triglycerides, Serum: 148  
BMI: BMI (kg/m2): 37.6 (01-11-21 @ 12:50)  HbA1c: A1C with Estimated Average Glucose Result: 5.5 % (01-12-21 @ 10:00)    Glucose:   BP: 128/81 (01-11-21 @ 11:17) (125/84 - 160/99)  Lipid Panel: Date/Time: 01-12-21 @ 10:00  Cholesterol, Serum: 185  Direct LDL: --  HDL Cholesterol, Serum: 34  Total Cholesterol/HDL Ration Measurement: --  Triglycerides, Serum: 148  
BMI: BMI (kg/m2): 37.6 (01-11-21 @ 12:50)  HbA1c: A1C with Estimated Average Glucose Result: 5.5 % (01-12-21 @ 10:00)    Glucose:   BP: 131/82 (01-18-21 @ 08:06) (131/82 - 134/83)  Lipid Panel: Date/Time: 01-12-21 @ 10:00  Cholesterol, Serum: 185  Direct LDL: --  HDL Cholesterol, Serum: 34  Total Cholesterol/HDL Ration Measurement: --  Triglycerides, Serum: 148

## 2021-01-21 NOTE — BH INPATIENT PSYCHIATRY PROGRESS NOTE - NSBHASSESSSUMMFT_PSY_ALL_CORE
On the unit: The patient was visible on the unit, guarded, superficially engaged in the interview. The patient has poor insight into her illness. She denies perceptual AH, VH, TH. Internally preoccupied in the interview. Paranoia elicited. She denies SI, HI, intent and plan. She continues to deny events leading up to her admission into the hospital. The patient is possible  minimalizing her symptoms to expedite discharge. The patient is cooperative with treatment.     >Obs: Routine, no current SI. no need for CO, patient not expected to pose risk to self or others in controlled inpatient setting.  >Psychiatric Meds: Abilify 15mg po daily, Lithium 300mg po twice a day started today, Cogentin 0.5mg po daily PRN. Lithium level ordered for 1/15.  >Medical:  No acute concerns  >Social: milieu therapy  >Treatment Interventions: Groups and Individual Therapy  >Dispo: Collateral and dispo planning pending further symptom and medication optimization.  
On the unit: The patient was visible on the unit, guarded, superficially engaged in the interview. The patient has poor insight into her illness but is accepting treatment. She continues to deny events leading up to her admission into the hospital and stated "I just have anxiety, none of that is true". The patient is possible  minimalizing her symptoms to expedite discharge. She is discharge focused and constantly asking to leave. She denies perceptual AH, VH, TH. Internally preoccupied in the interview. Paranoia elicited. She denies SI, HI, intent and plan.     >Obs: Routine, no current SI. no need for CO, patient not expected to pose risk to self or others in controlled inpatient setting.  >Psychiatric Meds: Abilify 15mg po daily, Lithium 300mg po twice a day started today, Cogentin 0.5mg po daily PRN.  >Medical:  No acute concerns  >Social: milieu therapy  >Treatment Interventions: Groups and Individual Therapy  >Dispo: Collateral and dispo planning pending further symptom and medication optimization.  
On the unit: The patient was visible on the unit, guarded, superficially engaged in the interview. The patient has poor insight into her illness. She denies perceptual AH, VH, TH. Internally preoccupied in the interview with delayed speech. Paranoia elicited. She denies SI, HI, intent and plan. The patient is cooperative with treatment and attends group therapy.     >Obs: Routine.  >Psychiatric Meds: Abilify increase to 20mg po daily, Lithium 300mg po twice a day started today, Cogentin 0.5mg po daily PRN. Lithium level ordered for 1/15. Will encourage SHARMA.   >Medical:  No acute concerns  >Social: milieu therapy  >Treatment Interventions: Groups and Individual Therapy  >Dispo: Collateral and dispo planning pending further symptom and medication optimization.  
On the unit: The patient was visible on the unit, guarded, superficially engaged, poor insight into her illness. She denies perceptual AH, VH, TH. Internally preoccupied with delayed speech. Paranoia elicited. She denies SI, HI, intent and plan. The patient is cooperative with treatment and attends group therapy. She is currently discharge focused.     >Obs: Routine.  >Psychiatric Meds:   Abilify 20mg po daily.  Lithium level was 0.4 today 1/19. Lithium was increased to 300mg po daily and 900mg po at bedtime.  Cogentin 0.5mg po daily PRN.   Patient refusing SHARMA at this time.   >Medical:  No acute concerns.  >Social: milieu therapy.  >Treatment Interventions: Groups and Individual Therapy.  >Dispo: Collateral and dispo planning pending further symptom and medication optimization.  
On the unit: The patient was visible on the unit, guarded, superficially engaged, poor insight into her illness. She is cooperative with treatment and verbalized motivation to continue outpatient treatment. She denies perceptual AH, VH, TH. Internally preoccupied with delayed speech. Paranoia elicited. She denies SI, HI, intent and plan.    >Obs: Routine.  >Psychiatric Meds:   Abilify 20mg po daily.  Lithium 1200mg po at bedtime.   Cogentin 0.5mg po daily PRN.   Patient refusing SHARMA at this time.   Discharge today.  >Medical:  No acute concerns.  >Social: milieu therapy.  >Treatment Interventions: Groups and Individual Therapy.  >Dispo: Collateral and dispo planning pending further symptom and medication optimization.  
On the unit: The patient was visible on the unit, guarded, superficially engaged in the interview. The patient has poor insight into her illness. She denies perceptual AH, VH, TH. Internally preoccupied in the interview with delayed speech. Paranoia elicited. She denies SI, HI, intent and plan. The patient is cooperative with treatment and attends group therapy.     >Obs: Routine.  >Psychiatric Meds: Abilify 20mg po daily, Lithium 300mg po daily and 600mg po at bedtime (lithium level 0.3), Cogentin 0.5mg po daily PRN. Will encourage SHARMA.  >CMP, CBC, Lithium level due on 1/19.   >Medical:  No acute concerns.  >Social: milieu therapy.  >Treatment Interventions: Groups and Individual Therapy.  >Dispo: Collateral and dispo planning pending further symptom and medication optimization.  
On the unit: The patient was visible on the unit, guarded, superficially engaged, poor insight into her illness. She is cooperative with treatment and verbalized motivation to continue outpatient treatment. She denies perceptual AH, VH, TH. Internally preoccupied with delayed speech. Paranoia elicited. She denies SI, HI, intent and plan. She is currently discharge focused.     >Obs: Routine.  >Psychiatric Meds:   Abilify 20mg po daily.  Lithium 1200mg po at bedtime.   Cogentin 0.5mg po daily PRN.   Patient refusing SHARMA at this time.   >Medical:  No acute concerns.  >Social: milieu therapy.  >Treatment Interventions: Groups and Individual Therapy.  >Dispo: Collateral and dispo planning pending further symptom and medication optimization.

## 2021-01-21 NOTE — BH INPATIENT PSYCHIATRY PROGRESS NOTE - NSCGISEVERILLNESS_PSY_ALL_CORE
4 = Moderately ill – overt symptoms causing noticeable, but modest, functional impairment or distress; symptom level may warrant medication

## 2021-01-21 NOTE — BH INPATIENT PSYCHIATRY DISCHARGE NOTE - NSDCMRMEDTOKEN_GEN_ALL_CORE_FT
ARIPiprazole 20 mg oral tablet: 1 tab(s) orally once a day  benztropine 0.5 mg oral tablet: 1 tab(s) orally once a day, As needed, akathisia  lithium 600 mg oral capsule: 2 cap(s) orally once a day (at bedtime)

## 2021-01-21 NOTE — BH INPATIENT PSYCHIATRY PROGRESS NOTE - NSBHCONTPROVIDER_PSY_ALL_CORE
No, attempted...

## 2021-01-21 NOTE — BH INPATIENT PSYCHIATRY DISCHARGE NOTE - NSDCCPCAREPLAN_GEN_ALL_CORE_FT
PRINCIPAL DISCHARGE DIAGNOSIS  Diagnosis: Schizoaffective disorder  Assessment and Plan of Treatment: Outpatient services provided.

## 2021-01-21 NOTE — BH INPATIENT PSYCHIATRY PROGRESS NOTE - NSBHCONSULTIPREASON_PSY_A_CORE
danger to others; mental illness expected to respond to inpatient care

## 2021-01-21 NOTE — BH INPATIENT PSYCHIATRY DISCHARGE NOTE - NSBHMETABOLIC_PSY_ALL_CORE_FT
BMI: BMI (kg/m2): 37.6 (01-11-21 @ 12:50)  HbA1c: A1C with Estimated Average Glucose Result: 5.5 % (01-12-21 @ 10:00)    Glucose:   BP: 126/96 (01-21-21 @ 08:15) (126/96 - 137/89)  Lipid Panel: Date/Time: 01-12-21 @ 10:00  Cholesterol, Serum: 185  Direct LDL: --  HDL Cholesterol, Serum: 34  Total Cholesterol/HDL Ration Measurement: --  Triglycerides, Serum: 148

## 2021-01-21 NOTE — BH INPATIENT PSYCHIATRY PROGRESS NOTE - NSBHCHARTREVIEWLAB_PSY_A_CORE FT
BMI: BMI (kg/m2): 37.6 (01-11-21 @ 12:50)  HbA1c: A1C with Estimated Average Glucose Result: 5.5 % (01-12-21 @ 10:00)    Glucose:   BP: 128/81 (01-11-21 @ 11:17) (125/84 - 160/99)  Lipid Panel: Date/Time: 01-12-21 @ 10:00  Cholesterol, Serum: 185  Direct LDL: --  HDL Cholesterol, Serum: 34  Total Cholesterol/HDL Ration Measurement: --  Triglycerides, Serum: 148  CBC Full  -  ( 10 Olivier 2021 18:28 )  WBC Count : 10.98 K/uL  RBC Count : 4.89 M/uL  Hemoglobin : 12.5 g/dL  Hematocrit : 39.3 %  Platelet Count - Automated : 335 K/uL  Mean Cell Volume : 80.4 fL  Mean Cell Hemoglobin : 25.6 pg  Mean Cell Hemoglobin Concentration : 31.8 gm/dL  Auto Neutrophil # : 6.39 K/uL  Auto Lymphocyte # : 3.64 K/uL  Auto Monocyte # : 0.72 K/uL  Auto Eosinophil # : 0.14 K/uL  Auto Basophil # : 0.05 K/uL  Auto Neutrophil % : 58.0 %  Auto Lymphocyte % : 33.2 %  Auto Monocyte % : 6.6 %  Auto Eosinophil % : 1.3 %  Auto Basophil % : 0.5 %  
BMI: BMI (kg/m2): 37.6 (01-11-21 @ 12:50)  HbA1c: A1C with Estimated Average Glucose Result: 5.5 % (01-12-21 @ 10:00)    Glucose:   BP: 128/81 (01-11-21 @ 11:17) (125/84 - 160/99)  Lipid Panel: Date/Time: 01-12-21 @ 10:00  Cholesterol, Serum: 185  Direct LDL: --  HDL Cholesterol, Serum: 34  Total Cholesterol/HDL Ration Measurement: --  Triglycerides, Serum: 148  CBC Full  -  ( 10 Loivier 2021 18:28 )  WBC Count : 10.98 K/uL  RBC Count : 4.89 M/uL  Hemoglobin : 12.5 g/dL  Hematocrit : 39.3 %  Platelet Count - Automated : 335 K/uL  Mean Cell Volume : 80.4 fL  Mean Cell Hemoglobin : 25.6 pg  Mean Cell Hemoglobin Concentration : 31.8 gm/dL  Auto Neutrophil # : 6.39 K/uL  Auto Lymphocyte # : 3.64 K/uL  Auto Monocyte # : 0.72 K/uL  Auto Eosinophil # : 0.14 K/uL  Auto Basophil # : 0.05 K/uL  Auto Neutrophil % : 58.0 %  Auto Lymphocyte % : 33.2 %  Auto Monocyte % : 6.6 %  Auto Eosinophil % : 1.3 %  Auto Basophil % : 0.5 %

## 2021-01-21 NOTE — BH INPATIENT PSYCHIATRY DISCHARGE NOTE - REASON FOR ADMISSION
Patient is a 46yo female, single, has 2 teenage sons (per sister, not in her custody) unemployed, domiciled intermittently with mom vs. homeless shelter, PPH of SAD and 5 past inpatient psych hospitalizations, most recently at [facility] in 2017, no past SAs, who presents to [facility] ED BIB sister for 1 week of worsening aggression towards family members, and suicidal statements.

## 2021-01-21 NOTE — BH INPATIENT PSYCHIATRY PROGRESS NOTE - NSICDXBHPRIMARYDX_PSY_ALL_CORE
Schizoaffective disorder   F25.9  

## 2021-01-21 NOTE — BH INPATIENT PSYCHIATRY PROGRESS NOTE - CURRENT MEDICATION
MEDICATIONS  (STANDING):  ARIPiprazole 15 milliGRAM(s) Oral at bedtime  lithium 300 milliGRAM(s) Oral two times a day    MEDICATIONS  (PRN):  benztropine 0.5 milliGRAM(s) Oral daily PRN akathisia  diphenhydrAMINE 50 milliGRAM(s) Oral every 6 hours PRN agitation  diphenhydrAMINE   Injectable 50 milliGRAM(s) IntraMuscular once PRN agitation  haloperidol     Tablet 5 milliGRAM(s) Oral every 6 hours PRN agitaion  haloperidol    Injectable 5 milliGRAM(s) IntraMuscular every 6 hours PRN severe agitaion  LORazepam     Tablet 2 milliGRAM(s) Oral every 6 hours PRN agitation  LORazepam   Injectable 2 milliGRAM(s) IntraMuscular every 6 hours PRN severe agitaion  melatonin. 5 milliGRAM(s) Oral at bedtime PRN Insomnia  
MEDICATIONS  (STANDING):  ARIPiprazole 20 milliGRAM(s) Oral daily  lithium 900 milliGRAM(s) Oral once    MEDICATIONS  (PRN):  benztropine 0.5 milliGRAM(s) Oral daily PRN akathisia  diphenhydrAMINE 50 milliGRAM(s) Oral every 6 hours PRN agitation  diphenhydrAMINE   Injectable 50 milliGRAM(s) IntraMuscular once PRN agitation  haloperidol     Tablet 5 milliGRAM(s) Oral every 6 hours PRN agitaion  haloperidol    Injectable 5 milliGRAM(s) IntraMuscular every 6 hours PRN severe agitaion  LORazepam   Injectable 2 milliGRAM(s) IntraMuscular every 6 hours PRN severe agitaion  melatonin. 5 milliGRAM(s) Oral at bedtime PRN Insomnia  
MEDICATIONS  (STANDING):  ARIPiprazole 20 milliGRAM(s) Oral daily  lithium 600 milliGRAM(s) Oral at bedtime    MEDICATIONS  (PRN):  benztropine 0.5 milliGRAM(s) Oral daily PRN akathisia  diphenhydrAMINE 50 milliGRAM(s) Oral every 6 hours PRN agitation  diphenhydrAMINE   Injectable 50 milliGRAM(s) IntraMuscular once PRN agitation  haloperidol     Tablet 5 milliGRAM(s) Oral every 6 hours PRN agitaion  haloperidol    Injectable 5 milliGRAM(s) IntraMuscular every 6 hours PRN severe agitaion  LORazepam     Tablet 2 milliGRAM(s) Oral every 6 hours PRN agitation  LORazepam   Injectable 2 milliGRAM(s) IntraMuscular every 6 hours PRN severe agitaion  melatonin. 5 milliGRAM(s) Oral at bedtime PRN Insomnia  
MEDICATIONS  (STANDING):  lithium 300 milliGRAM(s) Oral two times a day    MEDICATIONS  (PRN):  benztropine 0.5 milliGRAM(s) Oral daily PRN akathisia  diphenhydrAMINE 50 milliGRAM(s) Oral every 6 hours PRN agitation  diphenhydrAMINE   Injectable 50 milliGRAM(s) IntraMuscular once PRN agitation  haloperidol     Tablet 5 milliGRAM(s) Oral every 6 hours PRN agitaion  haloperidol    Injectable 5 milliGRAM(s) IntraMuscular every 6 hours PRN severe agitaion  LORazepam     Tablet 2 milliGRAM(s) Oral every 6 hours PRN agitation  LORazepam   Injectable 2 milliGRAM(s) IntraMuscular every 6 hours PRN severe agitaion  melatonin. 5 milliGRAM(s) Oral at bedtime PRN Insomnia  
MEDICATIONS  (STANDING):  ARIPiprazole 15 milliGRAM(s) Oral at bedtime  lithium 300 milliGRAM(s) Oral two times a day    MEDICATIONS  (PRN):  benztropine 0.5 milliGRAM(s) Oral daily PRN akathisia  diphenhydrAMINE 50 milliGRAM(s) Oral every 6 hours PRN agitation  diphenhydrAMINE   Injectable 50 milliGRAM(s) IntraMuscular once PRN agitation  haloperidol     Tablet 5 milliGRAM(s) Oral every 6 hours PRN agitaion  haloperidol    Injectable 5 milliGRAM(s) IntraMuscular every 6 hours PRN severe agitaion  LORazepam     Tablet 2 milliGRAM(s) Oral every 6 hours PRN agitation  LORazepam   Injectable 2 milliGRAM(s) IntraMuscular every 6 hours PRN severe agitaion  melatonin. 5 milliGRAM(s) Oral at bedtime PRN Insomnia  
MEDICATIONS  (STANDING):  ARIPiprazole 20 milliGRAM(s) Oral daily  lithium 300 milliGRAM(s) Oral daily  lithium 600 milliGRAM(s) Oral at bedtime    MEDICATIONS  (PRN):  benztropine 0.5 milliGRAM(s) Oral daily PRN akathisia  diphenhydrAMINE 50 milliGRAM(s) Oral every 6 hours PRN agitation  diphenhydrAMINE   Injectable 50 milliGRAM(s) IntraMuscular once PRN agitation  haloperidol     Tablet 5 milliGRAM(s) Oral every 6 hours PRN agitaion  haloperidol    Injectable 5 milliGRAM(s) IntraMuscular every 6 hours PRN severe agitaion  LORazepam   Injectable 2 milliGRAM(s) IntraMuscular every 6 hours PRN severe agitaion  melatonin. 5 milliGRAM(s) Oral at bedtime PRN Insomnia  
MEDICATIONS  (STANDING):  ARIPiprazole 20 milliGRAM(s) Oral daily  lithium 1200 milliGRAM(s) Oral at bedtime    MEDICATIONS  (PRN):  benztropine 0.5 milliGRAM(s) Oral daily PRN akathisia  diphenhydrAMINE 50 milliGRAM(s) Oral every 6 hours PRN agitation  diphenhydrAMINE   Injectable 50 milliGRAM(s) IntraMuscular once PRN agitation  haloperidol     Tablet 5 milliGRAM(s) Oral every 6 hours PRN agitaion  haloperidol    Injectable 5 milliGRAM(s) IntraMuscular every 6 hours PRN severe agitaion  LORazepam   Injectable 2 milliGRAM(s) IntraMuscular every 6 hours PRN severe agitaion  melatonin. 5 milliGRAM(s) Oral at bedtime PRN Insomnia

## 2021-01-21 NOTE — BH INPATIENT PSYCHIATRY PROGRESS NOTE - NSTXPROBANX_PSY_ALL_CORE
ANXIETY/PANIC/FEAR

## 2021-01-21 NOTE — BH INPATIENT PSYCHIATRY PROGRESS NOTE - NSBHMSEPERCEPT_PSY_A_CORE
No abnormalities
No abnormalities/Other
No abnormalities
No abnormalities

## 2021-01-21 NOTE — BH INPATIENT PSYCHIATRY PROGRESS NOTE - NSBHFUPINTERVALHXFT_PSY_A_CORE
Patient seen for follow up for schizoaffective disorder, aggression towards family, chart reviewed, and case discussed with treatment team.  No events reported overnight no PRN medication required for agitation. The patient was visible on the unit with minimal social contact. She attends group therapy regularly and verbalized learning coping skills. Her mood is "good", "I am ready to go home". Her sleep and appetite are "good". She denies perceptual AH, VH, TH. Suspected internal stimuli and delayed response. Paranoia elicited. She denies SI, HI, intent and plan. She is compliant with her medication regimen and denies side effects. Safety plans and discharge plans discussed. The patient was accompanied home by her sister.

## 2021-01-21 NOTE — BH INPATIENT PSYCHIATRY DISCHARGE NOTE - NSBHDCHANDOFFNOFT_PSY_A_CORE
Attempted on 1/22 and 1/25 at [facility] [phone]. Email sent to [facility] today.

## 2021-01-21 NOTE — BH INPATIENT PSYCHIATRY PROGRESS NOTE - NSTXDCHOUSGOAL_PSY_ALL_CORE
Will agree to participate in housing process

## 2021-01-21 NOTE — BH INPATIENT PSYCHIATRY PROGRESS NOTE - NSBHCONSDANGEROTHERS_PSY_A_CORE
assaultive threats with plan and means

## 2021-01-21 NOTE — BH INPATIENT PSYCHIATRY PROGRESS NOTE - NSBHADMITMEDEDUDETAILS_A_CORE FT
Medication education rendered with patient. 

## 2021-01-21 NOTE — BH INPATIENT PSYCHIATRY PROGRESS NOTE - NSBHCONSBHPROVDETAILS_PSY_A_CORE  FT
Delia Grant NP at [facility]. Attempted 1/13-placed on hold for an extended time. 
Delia Grant NP at [facility]. 
Delia Grant NP at [facility]. Attempted 1/13-placed on hold for an extended time. 

## 2021-01-21 NOTE — BH INPATIENT PSYCHIATRY PROGRESS NOTE - NSBHFUPINTERVALCCFT_PSY_A_CORE
"I'm good"
"Can you increase my medication?"
"When will I leave"
"I hoping to leave tomorrow"
"When will I leave?"
"I am ready to go home"
"I'm okay"

## 2021-01-21 NOTE — BH INPATIENT PSYCHIATRY PROGRESS NOTE - OTHER
"Good" at present appears linear, however presented with impaired reasoning  Flat. Delayed response.  suspected delusions and paranoia.

## 2021-01-21 NOTE — BH INPATIENT PSYCHIATRY PROGRESS NOTE - NSBHINPTBILLING_PSY_ALL_CORE
[id number] - Inpatient Moderate Complexity
92045 - Inpatient Moderate Complexity
26736 - Inpatient Moderate Complexity
80240 - Inpatient Moderate Complexity
51212 - Inpatient Moderate Complexity
38729 - Inpatient Moderate Complexity
78773 - Inpatient Moderate Complexity

## 2021-01-21 NOTE — BH INPATIENT PSYCHIATRY PROGRESS NOTE - NSBHMSESPEECH_PSY_A_CORE
Normal volume, rate, productivity, spontaneity and articulation
Abnormal as indicated, otherwise normal...
Abnormal as indicated, otherwise normal...
Normal volume, rate, productivity, spontaneity and articulation
Abnormal as indicated, otherwise normal...

## 2021-01-21 NOTE — BH INPATIENT PSYCHIATRY DISCHARGE NOTE - NSBHDCRISKMITIGATE_PSY_ALL_CORE
Safety planning/Referral to PROS/Medications targeting suicidality/non-suicidal self injurious behavior Safety planning/Referral to PROS/Medications targeting suicidality/non-suicidal self injurious behavior/Assisted outpatient treatment

## 2021-01-25 NOTE — SOCIAL WORK POST DISCHARGE FOLLOW UP NOTE - NSBHSWFOLLOWUP_PSY_ALL_CORE_FT
contacted patient's sister to notify of possible COVID exposure when patient was on the unit. She provided patient's number [phone]. Aniar left VM to notify of possible exposure, and to encourage quarantine and COVID testing.

## 2021-06-28 NOTE — PSYCHIATRIC REHAB INITIAL EVALUATION - VISION (WITH CORRECTIVE LENSES IF THE PATIENT USUALLY WEARS THEM):
Normal vision: sees adequately in most situations; can see medication labels, newsprint
vomiting, weakness
